# Patient Record
Sex: MALE | Race: WHITE | Employment: OTHER | URBAN - METROPOLITAN AREA
[De-identification: names, ages, dates, MRNs, and addresses within clinical notes are randomized per-mention and may not be internally consistent; named-entity substitution may affect disease eponyms.]

---

## 2017-02-06 ENCOUNTER — OFFICE VISIT (OUTPATIENT)
Dept: FAMILY MEDICINE CLINIC | Age: 67
End: 2017-02-06

## 2017-02-06 VITALS
SYSTOLIC BLOOD PRESSURE: 120 MMHG | OXYGEN SATURATION: 98 % | WEIGHT: 240 LBS | DIASTOLIC BLOOD PRESSURE: 66 MMHG | BODY MASS INDEX: 33.6 KG/M2 | RESPIRATION RATE: 15 BRPM | HEIGHT: 71 IN | HEART RATE: 72 BPM

## 2017-02-06 DIAGNOSIS — K21.9 GASTROESOPHAGEAL REFLUX DISEASE, ESOPHAGITIS PRESENCE NOT SPECIFIED: ICD-10-CM

## 2017-02-06 DIAGNOSIS — N40.0 BENIGN PROSTATIC HYPERPLASIA WITHOUT LOWER URINARY TRACT SYMPTOMS, UNSPECIFIED MORPHOLOGY: ICD-10-CM

## 2017-02-06 DIAGNOSIS — Z23 IMMUNIZATION, PNEUMOCOCCUS AND INFLUENZA: ICD-10-CM

## 2017-02-06 DIAGNOSIS — Z00.00 WELL ADULT EXAM: Primary | ICD-10-CM

## 2017-02-06 DIAGNOSIS — E78.00 HYPERCHOLESTEREMIA: ICD-10-CM

## 2017-02-06 DIAGNOSIS — G89.29 CHRONIC RIGHT-SIDED LOW BACK PAIN WITHOUT SCIATICA: ICD-10-CM

## 2017-02-06 DIAGNOSIS — M54.50 CHRONIC RIGHT-SIDED LOW BACK PAIN WITHOUT SCIATICA: ICD-10-CM

## 2017-02-06 DIAGNOSIS — M18.11 DEGENERATIVE ARTHRITIS OF THUMB, RIGHT: ICD-10-CM

## 2017-02-06 DIAGNOSIS — E55.9 VITAMIN D DEFICIENCY: ICD-10-CM

## 2017-02-06 LAB
A/G RATIO: 1.7 (ref 1.1–2.2)
ALBUMIN SERPL-MCNC: 4.5 G/DL (ref 3.4–5)
ALP BLD-CCNC: 73 U/L (ref 40–129)
ALT SERPL-CCNC: 35 U/L (ref 10–40)
ANION GAP SERPL CALCULATED.3IONS-SCNC: 13 MMOL/L (ref 3–16)
AST SERPL-CCNC: 28 U/L (ref 15–37)
BILIRUB SERPL-MCNC: 0.6 MG/DL (ref 0–1)
BUN BLDV-MCNC: 17 MG/DL (ref 7–20)
CALCIUM SERPL-MCNC: 9.4 MG/DL (ref 8.3–10.6)
CHLORIDE BLD-SCNC: 100 MMOL/L (ref 99–110)
CHOLESTEROL, TOTAL: 144 MG/DL (ref 0–199)
CO2: 26 MMOL/L (ref 21–32)
CREAT SERPL-MCNC: 1 MG/DL (ref 0.8–1.3)
GFR AFRICAN AMERICAN: >60
GFR NON-AFRICAN AMERICAN: >60
GLOBULIN: 2.7 G/DL
GLUCOSE BLD-MCNC: 103 MG/DL (ref 70–99)
HDLC SERPL-MCNC: 53 MG/DL (ref 40–60)
LDL CHOLESTEROL CALCULATED: 77 MG/DL
POTASSIUM SERPL-SCNC: 5.3 MMOL/L (ref 3.5–5.1)
PROSTATE SPECIFIC ANTIGEN: 6.21 NG/ML (ref 0–4)
SODIUM BLD-SCNC: 139 MMOL/L (ref 136–145)
TOTAL PROTEIN: 7.2 G/DL (ref 6.4–8.2)
TRIGL SERPL-MCNC: 72 MG/DL (ref 0–150)
VITAMIN D 25-HYDROXY: 54.4 NG/ML
VLDLC SERPL CALC-MCNC: 14 MG/DL

## 2017-02-06 PROCEDURE — 36415 COLL VENOUS BLD VENIPUNCTURE: CPT | Performed by: FAMILY MEDICINE

## 2017-02-06 PROCEDURE — 99397 PER PM REEVAL EST PAT 65+ YR: CPT | Performed by: FAMILY MEDICINE

## 2017-02-06 PROCEDURE — G0009 ADMIN PNEUMOCOCCAL VACCINE: HCPCS | Performed by: FAMILY MEDICINE

## 2017-02-06 PROCEDURE — 90670 PCV13 VACCINE IM: CPT | Performed by: FAMILY MEDICINE

## 2017-03-16 ENCOUNTER — OFFICE VISIT (OUTPATIENT)
Dept: DERMATOLOGY | Age: 67
End: 2017-03-16

## 2017-03-16 DIAGNOSIS — L57.0 AK (ACTINIC KERATOSIS): ICD-10-CM

## 2017-03-16 DIAGNOSIS — Z85.828 HISTORY OF SKIN CANCER: Primary | ICD-10-CM

## 2017-03-16 DIAGNOSIS — D22.9 MULTIPLE NEVI: ICD-10-CM

## 2017-03-16 PROCEDURE — 1123F ACP DISCUSS/DSCN MKR DOCD: CPT | Performed by: DERMATOLOGY

## 2017-03-16 PROCEDURE — G8427 DOCREV CUR MEDS BY ELIG CLIN: HCPCS | Performed by: DERMATOLOGY

## 2017-03-16 PROCEDURE — G8417 CALC BMI ABV UP PARAM F/U: HCPCS | Performed by: DERMATOLOGY

## 2017-03-16 PROCEDURE — 99213 OFFICE O/P EST LOW 20 MIN: CPT | Performed by: DERMATOLOGY

## 2017-03-16 PROCEDURE — G8484 FLU IMMUNIZE NO ADMIN: HCPCS | Performed by: DERMATOLOGY

## 2017-03-16 PROCEDURE — 4040F PNEUMOC VAC/ADMIN/RCVD: CPT | Performed by: DERMATOLOGY

## 2017-03-16 PROCEDURE — 3017F COLORECTAL CA SCREEN DOC REV: CPT | Performed by: DERMATOLOGY

## 2017-03-16 PROCEDURE — 1036F TOBACCO NON-USER: CPT | Performed by: DERMATOLOGY

## 2017-03-16 PROCEDURE — 17003 DESTRUCT PREMALG LES 2-14: CPT | Performed by: DERMATOLOGY

## 2017-03-16 PROCEDURE — 17000 DESTRUCT PREMALG LESION: CPT | Performed by: DERMATOLOGY

## 2017-04-04 RX ORDER — ATORVASTATIN CALCIUM 40 MG/1
TABLET, FILM COATED ORAL
Qty: 90 TABLET | Refills: 1 | Status: SHIPPED | OUTPATIENT
Start: 2017-04-04 | End: 2017-10-12 | Stop reason: SDUPTHER

## 2017-09-06 ENCOUNTER — OFFICE VISIT (OUTPATIENT)
Dept: FAMILY MEDICINE CLINIC | Age: 67
End: 2017-09-06

## 2017-09-06 VITALS
BODY MASS INDEX: 33.46 KG/M2 | OXYGEN SATURATION: 98 % | HEART RATE: 72 BPM | SYSTOLIC BLOOD PRESSURE: 132 MMHG | DIASTOLIC BLOOD PRESSURE: 76 MMHG | WEIGHT: 239 LBS | RESPIRATION RATE: 13 BRPM | HEIGHT: 71 IN

## 2017-09-06 DIAGNOSIS — R22.2 SUPRACLAVICULAR FOSSA FULLNESS: Primary | ICD-10-CM

## 2017-09-06 PROCEDURE — G8427 DOCREV CUR MEDS BY ELIG CLIN: HCPCS | Performed by: FAMILY MEDICINE

## 2017-09-06 PROCEDURE — 1123F ACP DISCUSS/DSCN MKR DOCD: CPT | Performed by: FAMILY MEDICINE

## 2017-09-06 PROCEDURE — 4040F PNEUMOC VAC/ADMIN/RCVD: CPT | Performed by: FAMILY MEDICINE

## 2017-09-06 PROCEDURE — G8417 CALC BMI ABV UP PARAM F/U: HCPCS | Performed by: FAMILY MEDICINE

## 2017-09-06 PROCEDURE — 1036F TOBACCO NON-USER: CPT | Performed by: FAMILY MEDICINE

## 2017-09-06 PROCEDURE — 3017F COLORECTAL CA SCREEN DOC REV: CPT | Performed by: FAMILY MEDICINE

## 2017-09-06 PROCEDURE — 99213 OFFICE O/P EST LOW 20 MIN: CPT | Performed by: FAMILY MEDICINE

## 2017-09-06 ASSESSMENT — PATIENT HEALTH QUESTIONNAIRE - PHQ9
SUM OF ALL RESPONSES TO PHQ QUESTIONS 1-9: 0
SUM OF ALL RESPONSES TO PHQ9 QUESTIONS 1 & 2: 0
2. FEELING DOWN, DEPRESSED OR HOPELESS: 0
1. LITTLE INTEREST OR PLEASURE IN DOING THINGS: 0

## 2017-09-11 ENCOUNTER — HOSPITAL ENCOUNTER (OUTPATIENT)
Dept: ULTRASOUND IMAGING | Age: 67
Discharge: HOME | End: 2017-09-11
Attending: FAMILY MEDICINE | Admitting: FAMILY MEDICINE

## 2017-09-11 DIAGNOSIS — R22.2 LOCALIZED SWELLING, MASS AND LUMP, TRUNK: ICD-10-CM

## 2017-09-11 DIAGNOSIS — R22.2 SUPRACLAVICULAR FOSSA FULLNESS: ICD-10-CM

## 2017-09-14 ENCOUNTER — OFFICE VISIT (OUTPATIENT)
Dept: SURGERY | Age: 67
End: 2017-09-14

## 2017-09-14 VITALS — SYSTOLIC BLOOD PRESSURE: 140 MMHG | BODY MASS INDEX: 33.24 KG/M2 | WEIGHT: 235 LBS | DIASTOLIC BLOOD PRESSURE: 80 MMHG

## 2017-09-14 DIAGNOSIS — R51.9 ACUTE NONINTRACTABLE HEADACHE, UNSPECIFIED HEADACHE TYPE: Primary | ICD-10-CM

## 2017-09-14 DIAGNOSIS — M54.2 NECK PAIN ON LEFT SIDE: ICD-10-CM

## 2017-09-14 PROCEDURE — 4040F PNEUMOC VAC/ADMIN/RCVD: CPT | Performed by: SURGERY

## 2017-09-14 PROCEDURE — G8427 DOCREV CUR MEDS BY ELIG CLIN: HCPCS | Performed by: SURGERY

## 2017-09-14 PROCEDURE — G8417 CALC BMI ABV UP PARAM F/U: HCPCS | Performed by: SURGERY

## 2017-09-14 PROCEDURE — 1036F TOBACCO NON-USER: CPT | Performed by: SURGERY

## 2017-09-14 PROCEDURE — 3017F COLORECTAL CA SCREEN DOC REV: CPT | Performed by: SURGERY

## 2017-09-14 PROCEDURE — 99202 OFFICE O/P NEW SF 15 MIN: CPT | Performed by: SURGERY

## 2017-09-14 ASSESSMENT — ENCOUNTER SYMPTOMS
ALLERGIC/IMMUNOLOGIC NEGATIVE: 1
EYES NEGATIVE: 1
TROUBLE SWALLOWING: 1
GASTROINTESTINAL NEGATIVE: 1
RESPIRATORY NEGATIVE: 1
SINUS PRESSURE: 1

## 2017-09-19 DIAGNOSIS — R22.1 NECK MASS: Primary | ICD-10-CM

## 2017-10-05 ENCOUNTER — HOSPITAL ENCOUNTER (OUTPATIENT)
Dept: CT IMAGING | Age: 67
Discharge: HOME | End: 2017-10-05
Attending: FAMILY MEDICINE | Admitting: SURGERY

## 2017-10-05 DIAGNOSIS — M54.2 NECK PAIN ON LEFT SIDE: ICD-10-CM

## 2017-10-05 DIAGNOSIS — R51.9 ACUTE NONINTRACTABLE HEADACHE, UNSPECIFIED HEADACHE TYPE: ICD-10-CM

## 2017-10-05 DIAGNOSIS — R51 HEADACHE(784.0): ICD-10-CM

## 2017-10-05 DIAGNOSIS — R51.9 HEADACHE: ICD-10-CM

## 2017-10-05 LAB
BUN BLDV-MCNC: 16 MG/DL (ref 7–20)
CREAT SERPL-MCNC: 0.9 MG/DL (ref 0.8–1.3)
GFR AFRICAN AMERICAN: >60
GFR NON-AFRICAN AMERICAN: >60

## 2017-10-05 NOTE — PROGRESS NOTES
Call pt with result  Has only fatty tissue in left lower neck region of concern, no surgery needed for this.  Still will recommend he see a Neurologist for clinical symptoms

## 2017-10-12 RX ORDER — ATORVASTATIN CALCIUM 40 MG/1
TABLET, FILM COATED ORAL
Qty: 90 TABLET | Refills: 1 | Status: SHIPPED | OUTPATIENT
Start: 2017-10-12 | End: 2018-04-17 | Stop reason: SDUPTHER

## 2017-10-18 ENCOUNTER — OFFICE VISIT (OUTPATIENT)
Dept: NEUROLOGY | Age: 67
End: 2017-10-18

## 2017-10-18 VITALS
HEIGHT: 71 IN | WEIGHT: 235 LBS | SYSTOLIC BLOOD PRESSURE: 157 MMHG | HEART RATE: 64 BPM | BODY MASS INDEX: 32.9 KG/M2 | DIASTOLIC BLOOD PRESSURE: 82 MMHG

## 2017-10-18 DIAGNOSIS — R20.2 NUMBNESS AND TINGLING: ICD-10-CM

## 2017-10-18 DIAGNOSIS — M79.2 NEURITIS: Primary | ICD-10-CM

## 2017-10-18 DIAGNOSIS — M54.2 NECK PAIN: ICD-10-CM

## 2017-10-18 DIAGNOSIS — R20.0 NUMBNESS AND TINGLING: ICD-10-CM

## 2017-10-18 PROCEDURE — 99204 OFFICE O/P NEW MOD 45 MIN: CPT | Performed by: PSYCHIATRY & NEUROLOGY

## 2017-10-18 PROCEDURE — 1123F ACP DISCUSS/DSCN MKR DOCD: CPT | Performed by: PSYCHIATRY & NEUROLOGY

## 2017-10-18 PROCEDURE — 4040F PNEUMOC VAC/ADMIN/RCVD: CPT | Performed by: PSYCHIATRY & NEUROLOGY

## 2017-10-18 PROCEDURE — G8427 DOCREV CUR MEDS BY ELIG CLIN: HCPCS | Performed by: PSYCHIATRY & NEUROLOGY

## 2017-10-18 PROCEDURE — 3017F COLORECTAL CA SCREEN DOC REV: CPT | Performed by: PSYCHIATRY & NEUROLOGY

## 2017-10-18 PROCEDURE — G8484 FLU IMMUNIZE NO ADMIN: HCPCS | Performed by: PSYCHIATRY & NEUROLOGY

## 2017-10-18 PROCEDURE — 1036F TOBACCO NON-USER: CPT | Performed by: PSYCHIATRY & NEUROLOGY

## 2017-10-18 PROCEDURE — G8417 CALC BMI ABV UP PARAM F/U: HCPCS | Performed by: PSYCHIATRY & NEUROLOGY

## 2017-10-18 NOTE — PROGRESS NOTES
[x] Denies all of the above     Cardiovascular:   []  Chest pain    []  Exertional chest pressure/discomfort           [] Palpitations    []  Syncope     [x] Denies all of the above    Gastrointestinal:   []  Abdominal pain   []  Constipation    []  Diarrhea    []   Dysphagia                      [x] Denies all of the above    Genitourinary:      []  Frequency   []  Hematuria     []  Urinary incontinence           [x] Denies all of the above     Hematologic/lymphatic:  []  Bleeding    []  Easy bruising   []  Anemia  [x] Denies all of the above     Musculoskeletal:   [] Back pain       []  Myalgias    [x]  Neck pain           [] Denies all of the above    Neurological: As noted in HPI    Behavioral/Psych:   [] Anxiety    []  Depression     []  Mood swings     [x] Denies all of the above     Endocrine:   []  Temperature intolerance     [x] Fatigue      [] Denies all of the above     Allergic/Immunologic:   [] Hay fever    [x] Denies all of the above     Past Medical History:   Diagnosis Date    Actinic keratosis     BPH (benign prostatic hypertrophy)     BPH w/o urinary obs/LUTS     Bulging disc 1990s    lumbar    Chest pain     Contusion of back     Costochondritis 9/7/2011    Degeneration of intervertebral disc, site unspecified     Diarrhea 9/7/2011    Dysphagia, unspecified     Eosinophilic esophagitis [id number]    DR Maite Marc, EGD    HCV (hepatitis C virus) 12/1997    Hyperpotassemia     Lumbago     Lumbar strain     acute     Other malignant neoplasm of skin, site unspecified     Pain in joint, shoulder region     Partial Achilles tendon tear     LT     Pharyngitis 9/7/2011    Schatzki's ring [id number]    DR Maite Marc, EGD    Screening colonoscopy     colonoscopy (benign polyps) every 5 years 3/2007    Stricture and stenosis of esophagus      Family History   Problem Relation Age of Onset    Depression Mother     Other Mother      CLAVICLE FX    Cancer Father      rectal    Alcohol Abuse Father     Other Father      vasc. heart valve age 61    High Blood Pressure Brother     Other Brother 48     ALS    Heart Disease Maternal Grandmother     Heart Disease Maternal Grandfather     Cancer Maternal Grandfather      colon    Tuberculosis Paternal Grandfather     Cancer Paternal Grandfather      Social History     Social History    Marital status:      Spouse name: N/A    Number of children: N/A    Years of education: N/A     Occupational History    Retired      Social History Main Topics    Smoking status: Former Smoker     Years: 19.00     Types: Cigarettes    Smokeless tobacco: Never Used      Comment: Educated to avoid tobacco.    Alcohol use 0.6 oz/week     1 Cans of beer per week      Comment: 3 to 4 times per week    Drug use: No    Sexual activity: Not Asked     Other Topics Concern    None     Social History Narrative    None       PHYSICAL EXAMINATION:  BP (!) 157/82   Pulse 64   Ht 5' 11\" (1.803 m)   Wt 235 lb (106.6 kg)   BMI 32.78 kg/m²   Appearance: Well appearing, well nourished and in no distress  Mental Status Exam: Patient is alert, oriented to person, place and time. Recent and remote memory is normal  Fund of Knowledge is normal  Attention/concentration is normal.   Speech : No dysarthria  Language : No aphasia  Funduscopic Exam: sharp disc margins  Cranial Nerves:   II: Visual fields:  Full to confrontation  III: Pupils:  equal, round, reactive to light  III,IV,VI: Extra Ocular Movements are intact. No nystagmus  V: Facial sensation is intact to pin prick and light touch  VII: Facial strength and movements: intact and symmetric smile,cheek puffing and eyebrow elevation  VIII: Hearing:  Intact to finger rub bilaterally  IX: Palate  elevation is symmetric  XI: Shoulder shrug is intact  XII: Tongue movements are normal  Motor:  Muscle tone and bulk are normal.   Strength is symmetrical 5/5 in all four extremities.   Sensory: Intact to light touch and  pin prick in all four extremities  Coordination:  Normal  Finger to Nose and Heel to Shin bilaterally    . Reflexes:  DTR +2 and symmetric bilaterally  Plantar response: Flexor bilaterally  Gait: Gait and station is normal. Patient can toe/ heel and tandem walk without difficulty  Romberg: negative  Vascular: No carotid bruit bilaterally        DATA:  LABS:  General Labs:    BMP:    Lab Results   Component Value Date     02/06/2017    K 5.3 02/06/2017     02/06/2017    CO2 26 02/06/2017    BUN 16 10/05/2017    LABALBU 4.5 02/06/2017    CREATININE 0.9 10/05/2017    CALCIUM 9.4 02/06/2017    GFRAA >60 10/05/2017    GFRAA >60 09/17/2012    LABGLOM >60 10/05/2017    LABGLOM 81.0 04/26/2011    GLUCOSE 103 02/06/2017    GLUCOSE 109 04/26/2011     RADIOLOGY REVIEW:  I have reviewed radiology report(s) of:  CT scan of the soft tissues of the neck    IMPRESSION :  Patient has neuritis in the left side of the neck causing some numbness and itching in the left earlobe and some numbness in the left side of the neck. The symptoms are worse when he turns his head and neck towards the left side. He has some fatty deposition in the left supraclavicular fossa and this might restrict the neck movement there. Cervical radiculopathy was considered in the differential diagnosis but felt to be less likely  CT scan of the soft tissues of the neck showed a fatty deposition in the left supraclavicular fossa      RECOMMENDATIONS :  Discussed treatment options with patient  I would like to start with physical therapy  I will see him back in a couple of months  If there is no improvement then we can try low-dose gabapentin  Thank you for this consultation        Please note a portion of this chart was generated using dragon dictation software. Although every effort was made to ensure the accuracy of this automated transcription, some errors in transcription may have occurred.

## 2017-10-18 NOTE — PATIENT INSTRUCTIONS
Please call with any questions or concerns:   [facility]  @ [phone]. LAB RESULTS:  Please obtain any labs or diagnostic tests as discussed today. You may call the office to check the results. Please allow  3 to 7 days for us to get these results. MEDICATION LIST:  Please bring an accurate list of your medications to every visit. APPOINTMENT CONFIRMATION:  We will call you the day before your scheduled appointment to confirm. If we are unable to reach you, you MUST call back by the end of the day to confirm the appointment or we may be forced to cancel.

## 2017-10-18 NOTE — LETTER
III,IV,VI: Extra Ocular Movements are intact. No nystagmus  V: Facial sensation is intact to pin prick and light touch  VII: Facial strength and movements: intact and symmetric smile,cheek puffing and eyebrow elevation  VIII: Hearing:  Intact to finger rub bilaterally  IX: Palate  elevation is symmetric  XI: Shoulder shrug is intact  XII: Tongue movements are normal  Motor:  Muscle tone and bulk are normal.   Strength is symmetrical 5/5 in all four extremities. Sensory: Intact to light touch and  pin prick in all four extremities  Coordination:  Normal  Finger to Nose and Heel to Shin bilaterally    . Reflexes:  DTR +2 and symmetric bilaterally  Plantar response: Flexor bilaterally  Gait: Gait and station is normal. Patient can toe/ heel and tandem walk without difficulty  Romberg: negative  Vascular: No carotid bruit bilaterally        DATA:  LABS:  General Labs:    BMP:    Lab Results   Component Value Date     02/06/2017    K 5.3 02/06/2017     02/06/2017    CO2 26 02/06/2017    BUN 16 10/05/2017    LABALBU 4.5 02/06/2017    CREATININE 0.9 10/05/2017    CALCIUM 9.4 02/06/2017    GFRAA >60 10/05/2017    GFRAA >60 09/17/2012    LABGLOM >60 10/05/2017    LABGLOM 81.0 04/26/2011    GLUCOSE 103 02/06/2017    GLUCOSE 109 04/26/2011     RADIOLOGY REVIEW:  I have reviewed radiology report(s) of:  CT scan of the soft tissues of the neck    IMPRESSION :  Patient has neuritis in the left side of the neck causing some numbness and itching in the left earlobe and some numbness in the left side of the neck. The symptoms are worse when he turns his head and neck towards the left side. He has some fatty deposition in the left supraclavicular fossa and this might restrict the neck movement there.   Cervical radiculopathy was considered in the differential diagnosis but felt to be less likely  CT scan of the soft tissues of the neck showed a fatty deposition in the left supraclavicular fossa      RECOMMENDATIONS : Discussed treatment options with patient  I would like to start with physical therapy  I will see him back in a couple of months  If there is no improvement then we can try low-dose gabapentin  Thank you for this consultation        Please note a portion of this chart was generated using dragon dictation software. Although every effort was made to ensure the accuracy of this automated transcription, some errors in transcription may have occurred. If you have questions, please do not hesitate to call me. I look forward to following Alivia Enter along with you.     Sincerely,        Helene Jackman MD    CC providers:  MD Shawnee Herr  [address], [address] [place] [address]  VIA [place]

## 2018-02-28 ENCOUNTER — OFFICE VISIT (OUTPATIENT)
Dept: ORTHOPEDIC SURGERY | Age: 68
End: 2018-02-28

## 2018-02-28 VITALS
SYSTOLIC BLOOD PRESSURE: 144 MMHG | HEIGHT: 71 IN | WEIGHT: 235 LBS | DIASTOLIC BLOOD PRESSURE: 83 MMHG | HEART RATE: 66 BPM | BODY MASS INDEX: 32.9 KG/M2

## 2018-02-28 DIAGNOSIS — M18.11 PRIMARY OSTEOARTHRITIS OF FIRST CARPOMETACARPAL JOINT OF RIGHT HAND: Primary | ICD-10-CM

## 2018-02-28 PROCEDURE — 1036F TOBACCO NON-USER: CPT | Performed by: ORTHOPAEDIC SURGERY

## 2018-02-28 PROCEDURE — 3017F COLORECTAL CA SCREEN DOC REV: CPT | Performed by: ORTHOPAEDIC SURGERY

## 2018-02-28 PROCEDURE — 99213 OFFICE O/P EST LOW 20 MIN: CPT | Performed by: ORTHOPAEDIC SURGERY

## 2018-02-28 PROCEDURE — 1123F ACP DISCUSS/DSCN MKR DOCD: CPT | Performed by: ORTHOPAEDIC SURGERY

## 2018-02-28 PROCEDURE — G8484 FLU IMMUNIZE NO ADMIN: HCPCS | Performed by: ORTHOPAEDIC SURGERY

## 2018-02-28 PROCEDURE — 4040F PNEUMOC VAC/ADMIN/RCVD: CPT | Performed by: ORTHOPAEDIC SURGERY

## 2018-02-28 PROCEDURE — G8417 CALC BMI ABV UP PARAM F/U: HCPCS | Performed by: ORTHOPAEDIC SURGERY

## 2018-02-28 PROCEDURE — G8427 DOCREV CUR MEDS BY ELIG CLIN: HCPCS | Performed by: ORTHOPAEDIC SURGERY

## 2018-02-28 PROCEDURE — 20605 DRAIN/INJ JOINT/BURSA W/O US: CPT | Performed by: ORTHOPAEDIC SURGERY

## 2018-02-28 NOTE — Clinical Note
Dear  Kati Crooks MD,  Thank you very much for your referral or Mr. Pritesh Rincon to me for evaluation and treatment of his Hand & Wrist condition. I appreciate your confidence in me and thank you for allowing me the opportunity to care for your patients. If I can be of any further assistance to you on this or any other patient, please do not hesitate to contact me. Sincerely,  Leonardo Washburn.  Anuj Molina MD IADL retraining/balance training/transfer training/bed mobility training/ROM/ADL retraining/strengthening

## 2018-03-01 NOTE — PROGRESS NOTES
Mr. Ja Lund returns today in follow-up of his previously treated  right Thumb CMC Degenerative Osteoarthritis. He was last seen in April, 2016 at which time he was treated with Conservative Measures and Splinting. He experienced moderate relief of his initial symptoms. He  has noticed symptom recurrence & progression  over the last several months. He returns today with Worsened symptoms of right Thumb CMC Degenerative Osteoarthritis, requesting further treatment. The patient's , past medical history, medications, allergies,  family history, social history, and review of systems have been reviewed and are recorded in the chart. Physical Exam:  Vitals  BP: (!) 144/83  Pulse: 66  Height: 5' 11\" (180.3 cm)  Weight: 235 lb (106.6 kg)  Mr. Ja Lund appears well, he is in no apparent distress, he demonstrates appropriate mood & affect. Skin, bilaterally: Skin color, texture, turgor normal. No rashes or lesions. Digital range of motion is full and equal bilateral otherwise normal bilaterally. Thumb range of motion limited and painful in all spheres at the basilar joint on the Right, greater than Left. Wrist range of motion is equal bilateral otherwise normal bilaterally. There is no evidence of gross joint instability bilaterally. Sensation is subjectively normal bilaterally. Vascular examination reveals normal and good capillary refill bilaterally. Swelling is mild on the Right, normal on the Left. Muscular strength is clinically appropriate bilaterally. Prominence is moderate at the radial base of the Thumb on the Right, normal on the Left. Examination of the right first Carpo-Metacarpal Joint of the wrist demonstrates moderate radial subluxation of the Thumb Metacarpal base upon the Trapezium. There is moderate pain with palpation at the [facility] joint line; pain is markedly worsened with Crank & Grind Maneuvers.   There is not additional discomfort at the [facility]

## 2018-03-01 NOTE — PATIENT INSTRUCTIONS
Information & Instructions   After Finger, Hand, Wrist, or Elbow Injection    Jhonny Gomez MD    You have received an injection of local anesthetic (Bupivicaine without Epinephrine) for comfort & a steroid (Kenalog) for its strong anti-inflammatory effects. In order to give the medication a chance to reduce your inflammation and discomfort, it is recommended that you take it easy for a day or so. You may use your hand and arm as you feel comfortable, but you should avoid highly strenuous activity and heavy use for several days. Relief from the injection will often not begin for several days, and you may not feel full relief for up to one month. It is not uncommon to experience some local discomfort or pain at or around the injection site for a few days. To relieve these symptoms you may do the following if you feel necessary:       Apply ice to the affected area 20 minutes on and 20 minutes off. Do not apply ice directly to the skin. Use a thin layer (T-shirt, pillowcase, towel, etc.) to protect the skin. - If allowed by your other medical physicians, you may take -     2 Tylenol extra strength tablets every 4-6 hours       1-2 Aleve tablets twice a day     2-3 Advil tablets two to three times a day    If you are diabetic, the steroid medication may increase your blood sugar, so you are advised to monitor your sugar more closely so you can adjust it accordingly for a few days following your injection. If you need assistance with the control of you blood sugar, please contact you primary care physician for further advice. I will request that you please call the office one month after your injection at [phone] if you have not experienced relief of your symptoms (unless I have instructed you otherwise). If your injection has given you good relief of you symptoms as expected, then you only need to call the office if your symptoms return.

## 2018-03-07 ENCOUNTER — TELEPHONE (OUTPATIENT)
Dept: FAMILY MEDICINE CLINIC | Age: 68
End: 2018-03-07

## 2018-03-07 DIAGNOSIS — N40.0 BENIGN PROSTATIC HYPERPLASIA, UNSPECIFIED WHETHER LOWER URINARY TRACT SYMPTOMS PRESENT: ICD-10-CM

## 2018-03-07 DIAGNOSIS — R97.20 ELEVATED PSA: Primary | ICD-10-CM

## 2018-03-07 DIAGNOSIS — R73.9 HYPERGLYCEMIA: ICD-10-CM

## 2018-03-07 DIAGNOSIS — Z13.220 LIPID SCREENING: ICD-10-CM

## 2018-03-07 DIAGNOSIS — E87.5 HYPERKALEMIA: ICD-10-CM

## 2018-03-22 ENCOUNTER — NURSE ONLY (OUTPATIENT)
Dept: FAMILY MEDICINE CLINIC | Age: 68
End: 2018-03-22

## 2018-03-22 DIAGNOSIS — E87.5 HYPERKALEMIA: ICD-10-CM

## 2018-03-22 DIAGNOSIS — R73.9 HYPERGLYCEMIA: ICD-10-CM

## 2018-03-22 DIAGNOSIS — N40.0 BENIGN PROSTATIC HYPERPLASIA, UNSPECIFIED WHETHER LOWER URINARY TRACT SYMPTOMS PRESENT: ICD-10-CM

## 2018-03-22 DIAGNOSIS — R97.20 ELEVATED PSA: ICD-10-CM

## 2018-03-22 DIAGNOSIS — Z23 NEED FOR PNEUMOCOCCAL VACCINATION: Primary | ICD-10-CM

## 2018-03-22 DIAGNOSIS — Z13.220 LIPID SCREENING: ICD-10-CM

## 2018-03-22 LAB
A/G RATIO: 1.8 (ref 1.1–2.2)
ALBUMIN SERPL-MCNC: 4.6 G/DL (ref 3.4–5)
ALP BLD-CCNC: 59 U/L (ref 40–129)
ALT SERPL-CCNC: 27 U/L (ref 10–40)
ANION GAP SERPL CALCULATED.3IONS-SCNC: 14 MMOL/L (ref 3–16)
AST SERPL-CCNC: 21 U/L (ref 15–37)
BILIRUB SERPL-MCNC: 0.6 MG/DL (ref 0–1)
BUN BLDV-MCNC: 16 MG/DL (ref 7–20)
CALCIUM SERPL-MCNC: 9.1 MG/DL (ref 8.3–10.6)
CHLORIDE BLD-SCNC: 98 MMOL/L (ref 99–110)
CHOLESTEROL, TOTAL: 152 MG/DL (ref 0–199)
CO2: 27 MMOL/L (ref 21–32)
CREAT SERPL-MCNC: 0.9 MG/DL (ref 0.8–1.3)
GFR AFRICAN AMERICAN: >60
GFR NON-AFRICAN AMERICAN: >60
GLOBULIN: 2.5 G/DL
GLUCOSE BLD-MCNC: 102 MG/DL (ref 70–99)
HDLC SERPL-MCNC: 68 MG/DL (ref 40–60)
LDL CHOLESTEROL CALCULATED: 69 MG/DL
POTASSIUM SERPL-SCNC: 5 MMOL/L (ref 3.5–5.1)
PROSTATE SPECIFIC ANTIGEN: 5.6 NG/ML (ref 0–4)
SODIUM BLD-SCNC: 139 MMOL/L (ref 136–145)
TOTAL PROTEIN: 7.1 G/DL (ref 6.4–8.2)
TRIGL SERPL-MCNC: 77 MG/DL (ref 0–150)
VLDLC SERPL CALC-MCNC: 15 MG/DL

## 2018-03-22 PROCEDURE — 90732 PPSV23 VACC 2 YRS+ SUBQ/IM: CPT | Performed by: FAMILY MEDICINE

## 2018-03-22 PROCEDURE — 36415 COLL VENOUS BLD VENIPUNCTURE: CPT | Performed by: FAMILY MEDICINE

## 2018-03-22 PROCEDURE — G0009 ADMIN PNEUMOCOCCAL VACCINE: HCPCS | Performed by: FAMILY MEDICINE

## 2018-03-29 ENCOUNTER — TELEPHONE (OUTPATIENT)
Dept: FAMILY MEDICINE CLINIC | Age: 68
End: 2018-03-29

## 2018-04-02 ENCOUNTER — OFFICE VISIT (OUTPATIENT)
Dept: DERMATOLOGY | Age: 68
End: 2018-04-02

## 2018-04-02 DIAGNOSIS — Z85.828 HISTORY OF NONMELANOMA SKIN CANCER: Primary | ICD-10-CM

## 2018-04-02 DIAGNOSIS — L57.0 AK (ACTINIC KERATOSIS): ICD-10-CM

## 2018-04-02 DIAGNOSIS — D22.9 MULTIPLE NEVI: ICD-10-CM

## 2018-04-02 PROCEDURE — 4040F PNEUMOC VAC/ADMIN/RCVD: CPT | Performed by: DERMATOLOGY

## 2018-04-02 PROCEDURE — G8427 DOCREV CUR MEDS BY ELIG CLIN: HCPCS | Performed by: DERMATOLOGY

## 2018-04-02 PROCEDURE — 3017F COLORECTAL CA SCREEN DOC REV: CPT | Performed by: DERMATOLOGY

## 2018-04-02 PROCEDURE — 1123F ACP DISCUSS/DSCN MKR DOCD: CPT | Performed by: DERMATOLOGY

## 2018-04-02 PROCEDURE — 1036F TOBACCO NON-USER: CPT | Performed by: DERMATOLOGY

## 2018-04-02 PROCEDURE — G8417 CALC BMI ABV UP PARAM F/U: HCPCS | Performed by: DERMATOLOGY

## 2018-04-02 PROCEDURE — 17003 DESTRUCT PREMALG LES 2-14: CPT | Performed by: DERMATOLOGY

## 2018-04-02 PROCEDURE — 99213 OFFICE O/P EST LOW 20 MIN: CPT | Performed by: DERMATOLOGY

## 2018-04-02 PROCEDURE — 17000 DESTRUCT PREMALG LESION: CPT | Performed by: DERMATOLOGY

## 2018-04-02 RX ORDER — RANITIDINE 150 MG/1
150 TABLET ORAL 2 TIMES DAILY
COMMUNITY
End: 2020-10-20 | Stop reason: ALTCHOICE

## 2018-04-05 ENCOUNTER — OFFICE VISIT (OUTPATIENT)
Dept: FAMILY MEDICINE CLINIC | Age: 68
End: 2018-04-05

## 2018-04-05 VITALS
HEART RATE: 68 BPM | WEIGHT: 232 LBS | HEIGHT: 70 IN | DIASTOLIC BLOOD PRESSURE: 74 MMHG | BODY MASS INDEX: 33.21 KG/M2 | SYSTOLIC BLOOD PRESSURE: 120 MMHG | RESPIRATION RATE: 16 BRPM

## 2018-04-05 DIAGNOSIS — Z00.00 WELL ADULT EXAM: Primary | ICD-10-CM

## 2018-04-05 DIAGNOSIS — N40.1 BENIGN PROSTATIC HYPERPLASIA WITH NOCTURIA: ICD-10-CM

## 2018-04-05 DIAGNOSIS — R97.20 ELEVATED PSA: ICD-10-CM

## 2018-04-05 DIAGNOSIS — R35.1 BENIGN PROSTATIC HYPERPLASIA WITH NOCTURIA: ICD-10-CM

## 2018-04-05 DIAGNOSIS — R07.89 CHEST WALL PAIN: ICD-10-CM

## 2018-04-05 DIAGNOSIS — E78.00 HYPERCHOLESTEREMIA: ICD-10-CM

## 2018-04-05 DIAGNOSIS — R73.9 HYPERGLYCEMIA: ICD-10-CM

## 2018-04-05 PROCEDURE — 4040F PNEUMOC VAC/ADMIN/RCVD: CPT | Performed by: FAMILY MEDICINE

## 2018-04-05 PROCEDURE — G8427 DOCREV CUR MEDS BY ELIG CLIN: HCPCS | Performed by: FAMILY MEDICINE

## 2018-04-05 PROCEDURE — G8417 CALC BMI ABV UP PARAM F/U: HCPCS | Performed by: FAMILY MEDICINE

## 2018-04-05 PROCEDURE — 3017F COLORECTAL CA SCREEN DOC REV: CPT | Performed by: FAMILY MEDICINE

## 2018-04-05 PROCEDURE — 99397 PER PM REEVAL EST PAT 65+ YR: CPT | Performed by: FAMILY MEDICINE

## 2018-04-05 PROCEDURE — 1123F ACP DISCUSS/DSCN MKR DOCD: CPT | Performed by: FAMILY MEDICINE

## 2018-04-05 PROCEDURE — 1036F TOBACCO NON-USER: CPT | Performed by: FAMILY MEDICINE

## 2018-04-17 RX ORDER — ATORVASTATIN CALCIUM 40 MG/1
TABLET, FILM COATED ORAL
Qty: 90 TABLET | Refills: 1 | Status: SHIPPED | OUTPATIENT
Start: 2018-04-17 | End: 2018-07-17 | Stop reason: SDUPTHER

## 2018-07-16 ENCOUNTER — TELEPHONE (OUTPATIENT)
Dept: FAMILY MEDICINE CLINIC | Age: 68
End: 2018-07-16

## 2018-07-16 NOTE — TELEPHONE ENCOUNTER
Pt did get his results of his calcium cardiac scoring. He just wants to know what all the numbers mean.   Please call

## 2018-07-17 DIAGNOSIS — R07.89 CHEST PRESSURE: Primary | ICD-10-CM

## 2018-07-17 RX ORDER — ATORVASTATIN CALCIUM 40 MG/1
TABLET, FILM COATED ORAL
Qty: 90 TABLET | Refills: 2 | Status: SHIPPED | OUTPATIENT
Start: 2018-07-17 | End: 2019-08-01 | Stop reason: SDUPTHER

## 2018-07-20 DIAGNOSIS — R93.1 ELEVATED CORONARY ARTERY CALCIUM SCORE: ICD-10-CM

## 2018-07-20 DIAGNOSIS — R07.9 CHEST PAIN, UNSPECIFIED TYPE: Primary | ICD-10-CM

## 2018-07-23 ENCOUNTER — HOSPITAL ENCOUNTER (OUTPATIENT)
Dept: NON INVASIVE DIAGNOSTICS | Age: 68
Discharge: HOME | End: 2018-07-23
Attending: INTERNAL MEDICINE | Admitting: INTERNAL MEDICINE

## 2018-07-23 DIAGNOSIS — R07.9 CHEST PAIN: ICD-10-CM

## 2018-07-23 LAB
LV EF: 73 %
LVEF MODALITY: NORMAL

## 2018-08-14 ENCOUNTER — OFFICE VISIT (OUTPATIENT)
Dept: CARDIOLOGY CLINIC | Age: 68
End: 2018-08-14

## 2018-08-14 VITALS
BODY MASS INDEX: 33.64 KG/M2 | SYSTOLIC BLOOD PRESSURE: 130 MMHG | RESPIRATION RATE: 14 BRPM | OXYGEN SATURATION: 97 % | WEIGHT: 235 LBS | HEART RATE: 62 BPM | DIASTOLIC BLOOD PRESSURE: 72 MMHG | HEIGHT: 70 IN

## 2018-08-14 DIAGNOSIS — R07.9 CHEST PAIN, UNSPECIFIED TYPE: Primary | ICD-10-CM

## 2018-08-14 DIAGNOSIS — E78.5 HYPERLIPIDEMIA, UNSPECIFIED HYPERLIPIDEMIA TYPE: ICD-10-CM

## 2018-08-14 DIAGNOSIS — R06.09 DYSPNEA ON EXERTION: ICD-10-CM

## 2018-08-14 PROCEDURE — 1036F TOBACCO NON-USER: CPT | Performed by: INTERNAL MEDICINE

## 2018-08-14 PROCEDURE — G8417 CALC BMI ABV UP PARAM F/U: HCPCS | Performed by: INTERNAL MEDICINE

## 2018-08-14 PROCEDURE — 99204 OFFICE O/P NEW MOD 45 MIN: CPT | Performed by: INTERNAL MEDICINE

## 2018-08-14 PROCEDURE — 1101F PT FALLS ASSESS-DOCD LE1/YR: CPT | Performed by: INTERNAL MEDICINE

## 2018-08-14 PROCEDURE — 1123F ACP DISCUSS/DSCN MKR DOCD: CPT | Performed by: INTERNAL MEDICINE

## 2018-08-14 PROCEDURE — G8427 DOCREV CUR MEDS BY ELIG CLIN: HCPCS | Performed by: INTERNAL MEDICINE

## 2018-08-14 PROCEDURE — 3017F COLORECTAL CA SCREEN DOC REV: CPT | Performed by: INTERNAL MEDICINE

## 2018-08-14 PROCEDURE — 4040F PNEUMOC VAC/ADMIN/RCVD: CPT | Performed by: INTERNAL MEDICINE

## 2018-09-13 ENCOUNTER — TELEPHONE (OUTPATIENT)
Dept: FAMILY MEDICINE CLINIC | Age: 68
End: 2018-09-13

## 2019-01-10 ENCOUNTER — TELEPHONE (OUTPATIENT)
Dept: FAMILY MEDICINE CLINIC | Age: 69
End: 2019-01-10

## 2019-01-10 DIAGNOSIS — E78.00 HYPERCHOLESTEREMIA: Primary | ICD-10-CM

## 2019-01-10 DIAGNOSIS — R97.20 ELEVATED PSA: ICD-10-CM

## 2019-01-17 ENCOUNTER — TELEPHONE (OUTPATIENT)
Dept: DERMATOLOGY | Age: 69
End: 2019-01-17

## 2019-04-02 ENCOUNTER — NURSE ONLY (OUTPATIENT)
Dept: FAMILY MEDICINE CLINIC | Age: 69
End: 2019-04-02
Payer: MEDICARE

## 2019-04-02 DIAGNOSIS — E78.00 HYPERCHOLESTEREMIA: ICD-10-CM

## 2019-04-02 DIAGNOSIS — R97.20 ELEVATED PSA: ICD-10-CM

## 2019-04-02 LAB
A/G RATIO: 1.4 (ref 1.1–2.2)
ALBUMIN SERPL-MCNC: 4.2 G/DL (ref 3.4–5)
ALP BLD-CCNC: 64 U/L (ref 40–129)
ALT SERPL-CCNC: 21 U/L (ref 10–40)
ANION GAP SERPL CALCULATED.3IONS-SCNC: 10 MMOL/L (ref 3–16)
AST SERPL-CCNC: 19 U/L (ref 15–37)
BILIRUB SERPL-MCNC: 0.6 MG/DL (ref 0–1)
BUN BLDV-MCNC: 14 MG/DL (ref 7–20)
CALCIUM SERPL-MCNC: 9.1 MG/DL (ref 8.3–10.6)
CHLORIDE BLD-SCNC: 101 MMOL/L (ref 99–110)
CHOLESTEROL, TOTAL: 139 MG/DL (ref 0–199)
CO2: 27 MMOL/L (ref 21–32)
CREAT SERPL-MCNC: 1 MG/DL (ref 0.8–1.3)
GFR AFRICAN AMERICAN: >60
GFR NON-AFRICAN AMERICAN: >60
GLOBULIN: 2.9 G/DL
GLUCOSE BLD-MCNC: 114 MG/DL (ref 70–99)
HDLC SERPL-MCNC: 51 MG/DL (ref 40–60)
LDL CHOLESTEROL CALCULATED: 65 MG/DL
POTASSIUM SERPL-SCNC: 4.4 MMOL/L (ref 3.5–5.1)
PROSTATE SPECIFIC ANTIGEN: 4.17 NG/ML (ref 0–4)
SODIUM BLD-SCNC: 138 MMOL/L (ref 136–145)
TOTAL PROTEIN: 7.1 G/DL (ref 6.4–8.2)
TRIGL SERPL-MCNC: 116 MG/DL (ref 0–150)
VLDLC SERPL CALC-MCNC: 23 MG/DL

## 2019-04-02 PROCEDURE — 36415 COLL VENOUS BLD VENIPUNCTURE: CPT | Performed by: FAMILY MEDICINE

## 2019-04-09 ENCOUNTER — OFFICE VISIT (OUTPATIENT)
Dept: FAMILY MEDICINE CLINIC | Age: 69
End: 2019-04-09
Payer: MEDICARE

## 2019-04-09 VITALS
DIASTOLIC BLOOD PRESSURE: 72 MMHG | RESPIRATION RATE: 16 BRPM | BODY MASS INDEX: 34.22 KG/M2 | HEART RATE: 76 BPM | WEIGHT: 239 LBS | SYSTOLIC BLOOD PRESSURE: 124 MMHG | HEIGHT: 70 IN

## 2019-04-09 DIAGNOSIS — R73.9 HYPERGLYCEMIA: ICD-10-CM

## 2019-04-09 DIAGNOSIS — E78.00 HYPERCHOLESTEREMIA: ICD-10-CM

## 2019-04-09 DIAGNOSIS — N40.0 BENIGN PROSTATIC HYPERPLASIA, UNSPECIFIED WHETHER LOWER URINARY TRACT SYMPTOMS PRESENT: ICD-10-CM

## 2019-04-09 DIAGNOSIS — R06.02 SOBOE (SHORTNESS OF BREATH ON EXERTION): ICD-10-CM

## 2019-04-09 DIAGNOSIS — M75.80 ROTATOR CUFF TENDINITIS, UNSPECIFIED LATERALITY: ICD-10-CM

## 2019-04-09 DIAGNOSIS — M54.2 NECK PAIN: ICD-10-CM

## 2019-04-09 DIAGNOSIS — Z00.00 WELL ADULT EXAM: Primary | ICD-10-CM

## 2019-04-09 PROCEDURE — G0438 PPPS, INITIAL VISIT: HCPCS | Performed by: FAMILY MEDICINE

## 2019-04-09 ASSESSMENT — PATIENT HEALTH QUESTIONNAIRE - PHQ9
2. FEELING DOWN, DEPRESSED OR HOPELESS: 0
SUM OF ALL RESPONSES TO PHQ QUESTIONS 1-9: 0
SUM OF ALL RESPONSES TO PHQ QUESTIONS 1-9: 0
SUM OF ALL RESPONSES TO PHQ9 QUESTIONS 1 & 2: 0
1. LITTLE INTEREST OR PLEASURE IN DOING THINGS: 0

## 2019-04-09 NOTE — PROGRESS NOTES
2019     Viktoria Urrutia (:  1950) is a 77 y.o. male, here for evaluation of the following medical concerns:    HPI  No concerns other than weight gain  Sloppy/ lazy winter per pt  Concern w/ diabetes  Urology appt this am - had turp procedure 3 years ago  Prostate exam good  Past Surgical History:   Procedure Laterality Date    APPENDECTOMY  2000        COLONOSCOPY      DR Devne Cruz, COLONOSCOPY, NO POLYPS, REPEAT 5 YRS    EXTERNAL EAR SURGERY      left - SCC    KNEE ARTHROSCOPY  2000    left    TURP  16    UPPER GASTROINTESTINAL ENDOSCOPY  [id number]    DR Deven Cruz, SCHATZKI'S RING AND EOSINOHILIC ESOPHAGITIS     Family History   Problem Relation Age of Onset    Depression Mother     Other Mother         CLAVICLE FX    Cancer Father         rectal    Alcohol Abuse Father     Other Father         vasc. heart valve age 61   Luis  High Blood Pressure Brother     Other Brother 48        ALS    Heart Disease Maternal Grandmother     Heart Disease Maternal Grandfather     Cancer Maternal Grandfather         colon    Tuberculosis Paternal Grandfather     Cancer Paternal Grandfather          Review of Systems  Good urine flow  Some neck pain - at times into head - no headaches/ dizzyness  Bruised feeling left chest - hasn't noticed for awhile  A little more winded w/ exertion - some congestion in lungs  Mild allergy/ sinus sx  Stress test negative  -   Fatty lump left supraclavicular unchanged. No swelling - occ tingling in feet - some color change   Some pain big toe knuckles - seems like rom reduced. gerd off and on - on zantac - hx of esophageal dilation  Good for 4 more of 10 years on colonoscopy  Good bm's. Sees derm later this month for skin check  Sees eye doctor annually  Told may have early cataract/ mild elevated iop  Vision good  Hearing good. Prior to Visit Medications    Medication Sig Taking?  Authorizing Provider   atorvastatin (LIPITOR) 40 MG tablet TAKE 1 TABLET BY MOUTH EVERY DAY Yes Shaunna Skiff, MD   ranitidine (ZANTAC) 150 MG tablet Take 150 mg by mouth 2 times daily Yes Historical Provider, MD   Cholecalciferol (VITAMIN D3) 5000 UNITS CAPS Take 1 capsule by mouth daily Indications: 2000 units daily per PT  Yes Historical Provider, MD   RESTASIS 0.05 % ophthalmic emulsion Place 1 drop into both eyes 2 times daily. Yes Historical Provider, MD   aspirin 81 MG EC tablet Take 81 mg by mouth daily. Yes Historical Provider, MD   multivitamin [facility]) per tablet Take 1 tablet by mouth daily. Yes Historical Provider, MD        Social History     Tobacco Use    Smoking status: Former Smoker     Packs/day: 1.00     Years: 19.00     Pack years: 19.00     Types: Cigarettes     Last attempt to quit: 1990     Years since quittin.2    Smokeless tobacco: Never Used    Tobacco comment: Educated to avoid tobacco.   Substance Use Topics    Alcohol use: Yes     Alcohol/week: 0.6 oz     Types: 1 Cans of beer per week     Comment: 3 to 4 times per week        Vitals:    19 1329   BP: 124/72   Site: Left Upper Arm   Position: Sitting   Cuff Size: Medium Adult   Pulse: 76   Resp: 16   Weight: 239 lb (108.4 kg)   Height: 5' 10\" (1.778 m)     Estimated body mass index is 34.29 kg/m² as calculated from the following:    Height as of this encounter: 5' 10\" (1.778 m). Weight as of this encounter: 239 lb (108.4 kg). Physical Exam   Constitutional: He appears well-developed. No distress. HENT:   Mouth/Throat: Oropharynx is clear and moist.   Eyes: Conjunctivae are normal. No scleral icterus. Cardiovascular: Normal rate, regular rhythm, normal heart sounds and intact distal pulses. Exam reveals no gallop. No murmur heard. Pulmonary/Chest: Effort normal and breath sounds normal. No respiratory distress. He has no wheezes. He has no rhonchi. He has no rales. Abdominal: Soft. Bowel sounds are normal. He exhibits no distension.  There is no

## 2019-04-29 ENCOUNTER — OFFICE VISIT (OUTPATIENT)
Dept: DERMATOLOGY | Age: 69
End: 2019-04-29
Payer: MEDICARE

## 2019-04-29 DIAGNOSIS — L57.0 AK (ACTINIC KERATOSIS): ICD-10-CM

## 2019-04-29 DIAGNOSIS — D22.9 MULTIPLE NEVI: ICD-10-CM

## 2019-04-29 DIAGNOSIS — Z85.828 HISTORY OF NONMELANOMA SKIN CANCER: Primary | ICD-10-CM

## 2019-04-29 PROCEDURE — 1036F TOBACCO NON-USER: CPT | Performed by: DERMATOLOGY

## 2019-04-29 PROCEDURE — 1123F ACP DISCUSS/DSCN MKR DOCD: CPT | Performed by: DERMATOLOGY

## 2019-04-29 PROCEDURE — G8427 DOCREV CUR MEDS BY ELIG CLIN: HCPCS | Performed by: DERMATOLOGY

## 2019-04-29 PROCEDURE — 99213 OFFICE O/P EST LOW 20 MIN: CPT | Performed by: DERMATOLOGY

## 2019-04-29 PROCEDURE — 17004 DESTROY PREMAL LESIONS 15/>: CPT | Performed by: DERMATOLOGY

## 2019-04-29 PROCEDURE — G8417 CALC BMI ABV UP PARAM F/U: HCPCS | Performed by: DERMATOLOGY

## 2019-04-29 PROCEDURE — 3017F COLORECTAL CA SCREEN DOC REV: CPT | Performed by: DERMATOLOGY

## 2019-04-29 PROCEDURE — 4040F PNEUMOC VAC/ADMIN/RCVD: CPT | Performed by: DERMATOLOGY

## 2019-04-29 NOTE — PROGRESS NOTES
History:   Procedure Laterality Date    APPENDECTOMY  02/2000        COLONOSCOPY  2012    DR Therese Boyer, COLONOSCOPY, NO POLYPS, REPEAT 5 YRS    EXTERNAL EAR SURGERY  1/13    left - SCC    KNEE ARTHROSCOPY  1/2000    left    TURP  1/25/16    UPPER GASTROINTESTINAL ENDOSCOPY  [id number]    DR Therese Boyer, SCHATZKI'S RING AND EOSINOHILIC ESOPHAGITIS       Outpatient Medications Marked as Taking for the 4/29/19 encounter (Office Visit) with Andrew Raymond MD   Medication Sig Dispense Refill    atorvastatin (LIPITOR) 40 MG tablet TAKE 1 TABLET BY MOUTH EVERY DAY 90 tablet 2    ranitidine (ZANTAC) 150 MG tablet Take 150 mg by mouth 2 times daily      Cholecalciferol (VITAMIN D3) 5000 UNITS CAPS Take 1 capsule by mouth daily Indications: 2000 units daily per PT       RESTASIS 0.05 % ophthalmic emulsion Place 1 drop into both eyes 2 times daily. 1    aspirin 81 MG EC tablet Take 81 mg by mouth daily.  multivitamin (THERAGRAN) per tablet Take 1 tablet by mouth daily. No Known Allergies      Physical Examination     Gen, well-appearing  The following were examined and determined to be normal: Psych/Neuro, Scalp/hair, Conjunctivae/eyelids, Gums/teeth/lips, Neck, Breast/axilla/chest, Abdomen, Back, Nails/digits and buttocks. RUE, LUE. RLE,   The following were examined and determined to be abnormal: Head/face, LLE  Scars clear  trunk and extremities with scattered brown macules and papules   L Lazar, helices, and face + arms - erythematous roughly scaled macules    Assessment and Plan     1. Hx of NMSC, no signs recurrence  2. Few benign-appearing nevi  - educ re ABCD's of MM   educ sun protection   encouraged skin check yearly (sooner if indicated), self checks    3. AK's - L shins, arms, face and helices  - cont sun protection  - 15 total lesion(s) treated with liquid nitrogen x 2 cycles. Patient educated on risk of blister, hypopigmentation/scar and wound care.    - consider PDT in the future if needed    F/u 1 year.

## 2019-08-02 RX ORDER — ATORVASTATIN CALCIUM 40 MG/1
TABLET, FILM COATED ORAL
Qty: 90 TABLET | Refills: 2 | Status: SHIPPED | OUTPATIENT
Start: 2019-08-02 | End: 2020-05-21

## 2020-02-06 ENCOUNTER — NURSE ONLY (OUTPATIENT)
Dept: FAMILY MEDICINE CLINIC | Age: 70
End: 2020-02-06
Payer: MEDICARE

## 2020-02-06 LAB — HBA1C MFR BLD: 5.9 %

## 2020-02-06 PROCEDURE — 83036 HEMOGLOBIN GLYCOSYLATED A1C: CPT | Performed by: FAMILY MEDICINE

## 2020-04-29 ENCOUNTER — TELEPHONE (OUTPATIENT)
Dept: DERMATOLOGY | Age: 70
End: 2020-04-29

## 2020-04-29 NOTE — TELEPHONE ENCOUNTER
Pt calling states he has a spot on his back that he has concern about noticed it 6 months ago pls return patient call back to see if he can submit photos and set up VV with  pls call back @ [phone] to discuss

## 2020-04-29 NOTE — TELEPHONE ENCOUNTER
Patient's wife noticed a lesion on the patient's back in November and then recently-the area has changed in appearance. Pictures to follow.

## 2020-04-30 ENCOUNTER — VIRTUAL VISIT (OUTPATIENT)
Dept: DERMATOLOGY | Age: 70
End: 2020-04-30
Payer: MEDICARE

## 2020-04-30 PROCEDURE — 99212 OFFICE O/P EST SF 10 MIN: CPT | Performed by: DERMATOLOGY

## 2020-04-30 NOTE — PROGRESS NOTES
TELEHEALTH EVALUATION -- Audio/Visual (During [id number] public health emergency)       Cyndi Daily  1950    72 y.o. male     Date of Visit: 4/30/2020    Due to the COVID-19 pandemic restrictions on close contact interactions, this visit was conducted via telemedicine using doxy. me with audio and video in lieu of a face-to-face visit. Chief Complaint: lesion  Chief Complaint   Patient presents with    Skin Lesion     on back-pictures sent       History of Present Illness:    Last seen 4-29-19. Due for FSE now but deferred d/t COVID pandemic. He notes a concerning lesion on the back. Present for several mos at least and is asx but appears darker in color than it did a few mos ago. No pain or bleeding. Hx of AK's. Few rough spots on the temples but all asx. No other new concerning lesions. + hx of NMSC - BCC and SCC (R thigh and face), last was ~ 2013  No family hx of melanoma. He wears sunscreen - plays golf. Always wears a hat. Often doesn't reapply. Review of Systems:  Gen: Feels well, good sense of health. Skin: No new or changing moles. Past Medical History, Family History, Surgical History, Medications and Allergies reviewed.     Past Medical History:   Diagnosis Date    Actinic keratosis     BPH (benign prostatic hypertrophy)     BPH w/o urinary obs/LUTS     Bulging disc 1990s    lumbar    Chest pain     Contusion of back     Costochondritis 9/7/2011    Degeneration of intervertebral disc, site unspecified     Diarrhea 9/7/2011    Dysphagia, unspecified     Eosinophilic esophagitis [id number]    DR Kiesha Peña, EGD    HCV (hepatitis C virus) 12/1997    Hyperpotassemia     Lumbago     Lumbar strain     acute     Other malignant neoplasm of skin, site unspecified     Pain in joint, shoulder region     Partial Achilles tendon tear     LT     Pharyngitis 9/7/2011    Schatzki's ring [id number]    DR Kiesha Peña, EGD    Screening colonoscopy     colonoscopy (benign

## 2020-05-21 RX ORDER — ATORVASTATIN CALCIUM 40 MG/1
TABLET, FILM COATED ORAL
Qty: 90 TABLET | Refills: 0 | Status: SHIPPED | OUTPATIENT
Start: 2020-05-21 | End: 2020-09-09

## 2020-09-09 RX ORDER — ATORVASTATIN CALCIUM 40 MG/1
TABLET, FILM COATED ORAL
Qty: 30 TABLET | Refills: 0 | Status: SHIPPED | OUTPATIENT
Start: 2020-09-09 | End: 2020-10-15 | Stop reason: SDUPTHER

## 2020-09-16 ENCOUNTER — OFFICE VISIT (OUTPATIENT)
Dept: ORTHOPEDIC SURGERY | Age: 70
End: 2020-09-16
Payer: MEDICARE

## 2020-09-16 VITALS — HEIGHT: 70 IN | WEIGHT: 225 LBS | BODY MASS INDEX: 32.21 KG/M2 | RESPIRATION RATE: 16 BRPM | TEMPERATURE: 97.2 F

## 2020-09-16 PROCEDURE — 1123F ACP DISCUSS/DSCN MKR DOCD: CPT | Performed by: ORTHOPAEDIC SURGERY

## 2020-09-16 PROCEDURE — G8417 CALC BMI ABV UP PARAM F/U: HCPCS | Performed by: ORTHOPAEDIC SURGERY

## 2020-09-16 PROCEDURE — 20600 DRAIN/INJ JOINT/BURSA W/O US: CPT | Performed by: ORTHOPAEDIC SURGERY

## 2020-09-16 PROCEDURE — 3017F COLORECTAL CA SCREEN DOC REV: CPT | Performed by: ORTHOPAEDIC SURGERY

## 2020-09-16 PROCEDURE — 1036F TOBACCO NON-USER: CPT | Performed by: ORTHOPAEDIC SURGERY

## 2020-09-16 PROCEDURE — G8427 DOCREV CUR MEDS BY ELIG CLIN: HCPCS | Performed by: ORTHOPAEDIC SURGERY

## 2020-09-16 PROCEDURE — 4040F PNEUMOC VAC/ADMIN/RCVD: CPT | Performed by: ORTHOPAEDIC SURGERY

## 2020-09-16 PROCEDURE — 99213 OFFICE O/P EST LOW 20 MIN: CPT | Performed by: ORTHOPAEDIC SURGERY

## 2020-09-16 NOTE — Clinical Note
Dear  Edgar Baltazar MD,    Thank you very much for your referral or Mr. Alfredo Liu to me for evaluation and treatment of his Hand & Wrist condition. I appreciate your confidence in me and thank you for allowing me the opportunity to care for your patients. If I can be of any further assistance to you on this or any other patient, please do not hesitate to contact me. Sincerely,    Ciro Gann.  Mateo Yancey MD

## 2020-09-16 NOTE — PROGRESS NOTES
showing evidence of persistent Thumb CMC Degenerative Osteoarthritis after previous treatment. He requests additional treatment at this time. Plan:      I have had a thorough discussion with Mr. Susanne Barton regarding the treatment options available for his worsened Right Thumb CMC joint osteoarthritis, which is causing him functional limitations. I have outlined for Mr. Susanne Barton the benefits and consequences of the various treatment modalities, including a reasonable expectation for the long term success and the likelihood that further more aggressive treatment may be required for his current presenting condition. Based upon our current discussion and a reasonable understating of the options available to him, Mr. Susanne Barton has selected to proceed with  an injection to the right Thumb CMC joint(s). I have outlined for him the nature of the injection, and the pre, maksim and post injection considerations and the appropriate expectations for this injection. I have clearly explained to him that the above outlined treatment plan should not be expected to 'cure' his Thumb CMC osteoarthritis, but we are rather treating the symptoms with which he presents. He has understood that in order to achieve long lasting relief of his symptoms and to prevent future worsening or further damage, that definitive surgical treatment would be required. Mr. Susanne Barton  voiced an appropriate understanding of our discussion, the options available to him, and of the expectations of his selected  treatment. He did wish to proceed with Right Thumb CMC joint injection. Procedure:  Right Thumb CMC Joint injection [second Injection]: After full discussion of the nature of [facility] Joint osteoarthritis and outlining a treatment plan with Mr. Susanne Barton, we discussed the complications, limitations, expectations, alternatives, and risks of injection to the first carpo-metacarpal joint.   He understood this information well and verbally consented to this treatment. The skin of the symptomatic extremity was prepped with Isopropyl Alcohol and under aseptic conditions the  first carpal metacarpal joint was injected with a combination of 1ml of Triamcinolone (40 mg/ml) and Bupivacaine 0.25% without Epinephrine. There was good filling of the joint space. A dry, sterile bandage was applied and he  tolerated the injection without difficulty. I advised him  of the expected response, possible reactions and the instructions for care of the hand. I have also discussed with Mr. Ismael Nguyen the other treatment options available to him for this condition. We have today selected to proceed with treatment by injection with steroid medication. He and I have agreed that if our current course of Injection treatment does not prove to be effective over the short term future, that he will schedule a follow-up appointment to discuss and select an alternate course of therapy including possibly further conservative treatment or surgical treatment. Mr. Ismael Nguyen has been given a full verbal list of instructions and precautions related to his present condition. I have asked him to followup with me in the office at the prescribed time. He is also specifically requested to call or return to the office sooner if his symptoms change or worsen prior to the next scheduled appointment.

## 2020-09-16 NOTE — PATIENT INSTRUCTIONS
Information & Instructions   After Finger, Hand, Wrist, or Elbow Injection    Luis Miguel Tidwell MD    You have received an injection of local anesthetic (Bupivicaine without Epinephrine) for comfort & a steroid (Kenalog) for its strong anti-inflammatory effects. In order to give the medication a chance to reduce your inflammation and discomfort, it is recommended that you take it easy for a day or so. You may use your hand and arm as you feel comfortable, but you should avoid highly strenuous activity and heavy use for several days. Relief from the injection will often not begin for several days, and you may not feel full relief for up to one month. It is not uncommon to experience some local discomfort or pain at or around the injection site for a few days. To relieve these symptoms you may do the following if you feel necessary:       Apply ice to the affected area 20 minutes on and 20 minutes off. Do not apply ice directly to the skin. Use a thin layer (T-shirt, pillowcase, towel, etc.) to protect the skin. - If allowed by your other medical physicians, you may take -     2 Tylenol extra strength tablets every 4-6 hours       1-2 Aleve tablets twice a day     2-3 Advil tablets two to three times a day    If you are diabetic, the steroid medication may increase your blood sugar, so you are advised to monitor your sugar more closely so you can adjust it accordingly for a few days following your injection. If you need assistance with the control of you blood sugar, please contact you primary care physician for further advice. I will request that you please call the office one month after your injection at [phone] if you have not experienced relief of your symptoms (unless I have instructed you otherwise). If your injection has given you good relief of you symptoms as expected, then you only need to call the office if your symptoms return.

## 2020-10-05 RX ORDER — ATORVASTATIN CALCIUM 40 MG/1
TABLET, FILM COATED ORAL
Qty: 30 TABLET | Refills: 0 | OUTPATIENT
Start: 2020-10-05

## 2020-10-15 ENCOUNTER — TELEPHONE (OUTPATIENT)
Dept: FAMILY MEDICINE CLINIC | Age: 70
End: 2020-10-15

## 2020-10-15 RX ORDER — ATORVASTATIN CALCIUM 40 MG/1
TABLET, FILM COATED ORAL
Qty: 90 TABLET | Refills: 1 | Status: SHIPPED | OUTPATIENT
Start: 2020-10-15 | End: 2021-04-29

## 2020-10-20 ENCOUNTER — VIRTUAL VISIT (OUTPATIENT)
Dept: FAMILY MEDICINE CLINIC | Age: 70
End: 2020-10-20
Payer: MEDICARE

## 2020-10-20 PROCEDURE — 99213 OFFICE O/P EST LOW 20 MIN: CPT | Performed by: FAMILY MEDICINE

## 2020-10-20 RX ORDER — FAMOTIDINE 20 MG/1
20 TABLET, FILM COATED ORAL DAILY
COMMUNITY

## 2020-10-20 ASSESSMENT — PATIENT HEALTH QUESTIONNAIRE - PHQ9
SUM OF ALL RESPONSES TO PHQ QUESTIONS 1-9: 0
1. LITTLE INTEREST OR PLEASURE IN DOING THINGS: 0
SUM OF ALL RESPONSES TO PHQ9 QUESTIONS 1 & 2: 0
SUM OF ALL RESPONSES TO PHQ QUESTIONS 1-9: 0
2. FEELING DOWN, DEPRESSED OR HOPELESS: 0
SUM OF ALL RESPONSES TO PHQ QUESTIONS 1-9: 0

## 2020-10-20 NOTE — PROGRESS NOTES
10/20/2020    TELEHEALTH EVALUATION -- Audio/Visual (During [facility]QU[facility]- public health emergency)    HPI:    Antionette Em (:  1950) has requested an audio/video evaluation for the following concern(s):    Chief Complaint   Patient presents with    Hyperlipidemia     CHOLESTEROL ROUTINE FOLLOW UP     BP Readings from Last 3 Encounters:   19 124/72   18 130/72   18 120/74     Pulse Readings from Last 3 Encounters:   19 76   18 62   18 68     Wt Readings from Last 3 Encounters:   20 225 lb (102.1 kg)   19 239 lb (108.4 kg)   18 235 lb (106.6 kg)   escaped covid so far  occ walking/ biking/ golfing about the same  Sleeping okay  Diet/ weight stable  occ bad night of sleeping. No trouble w/ acid reflux lately. On pepcid once daily 20mg   Still on baby asa, mvi, vit d  O/w feels pretty good. Past Surgical History:   Procedure Laterality Date    APPENDECTOMY  2000        COLONOSCOPY      DR Bethanie Lopez, COLONOSCOPY, NO POLYPS, REPEAT 5 YRS    EXTERNAL EAR SURGERY      left - SCC    KNEE ARTHROSCOPY  2000    left    TURP  16    UPPER GASTROINTESTINAL ENDOSCOPY  [id number]    DR Bethanie Lopez, SCHATZKI'S RING AND EOSINOHILIC ESOPHAGITIS     Stress test ok 2 years ago  Some oa pain but stable  Sees derm regularly  Review of Systems    Prior to Visit Medications    Medication Sig Taking? Authorizing Provider   famotidine (PEPCID) 20 MG tablet Take 20 mg by mouth daily Yes Historical Provider, MD   atorvastatin (LIPITOR) 40 MG tablet TAKE 1 TABLET BY MOUTH EVERY DAY Yes Sarai Rueda APRN - CNP   Cholecalciferol (VITAMIN D3) 5000 UNITS CAPS Take 1 capsule by mouth daily Indications: 2000 units daily per PT  Yes Historical Provider, MD   RESTASIS 0.05 % ophthalmic emulsion Place 1 drop into both eyes 2 times daily. Yes Historical Provider, MD   aspirin 81 MG EC tablet Take 81 mg by mouth daily.    Yes Historical Provider, MD multivitamin (THERAGRAN) per tablet Take 1 tablet by mouth daily. Yes Historical Provider, MD       Social History     Tobacco Use    Smoking status: Former Smoker     Packs/day: 1.00     Years: 19.00     Pack years: 19.00     Types: Cigarettes     Last attempt to quit: 1990     Years since quittin.8    Smokeless tobacco: Never Used    Tobacco comment: Educated to avoid tobacco.   Substance Use Topics    Alcohol use: Yes     Alcohol/week: 1.0 standard drinks     Types: 1 Cans of beer per week     Comment: 3 to 4 times per week    Drug use: No        PHYSICAL EXAMINATION:  [ INSTRUCTIONS:  \"[x]\" Indicates a positive item  \"[]\" Indicates a negative item  -- DELETE ALL ITEMS NOT EXAMINED]  Vital Signs: (As obtained by patient/caregiver or practitioner observation)    Blood pressure-  Heart rate-    Respiratory rate-    Temperature-  Pulse oximetry-     Constitutional: [x] Appears well-developed and well-nourished [] No apparent distress      [] Abnormal-   Mental status  [x] Alert and awake  [] Oriented to person/place/time []Able to follow commands      Eyes:  EOM    []  Normal  [] Abnormal-  Sclera  []  Normal  [] Abnormal -         Discharge []  None visible  [] Abnormal -    HENT:   [x] Normocephalic, atraumatic.   [] Abnormal   [] Mouth/Throat: Mucous membranes are moist.     External Ears [] Normal  [] Abnormal-     Neck: [] No visualized mass     Pulmonary/Chest: [x] Respiratory effort normal.  [] No visualized signs of difficulty breathing or respiratory distress        [] Abnormal-      Musculoskeletal:   [] Normal gait with no signs of ataxia         [] Normal range of motion of neck        [] Abnormal-       Neurological:        [x] No Facial Asymmetry (Cranial nerve 7 motor function) (limited exam to video visit)          [] No gaze palsy        [] Abnormal-         Skin:        [x] No significant exanthematous lesions or discoloration noted on facial skin         [] Abnormal- Psychiatric:       [x] Normal Affect [] No Hallucinations        [] Abnormal-     Other pertinent observable physical exam findings-     ASSESSMENT/PLAN:   Diagnosis Orders   1. Benign prostatic hyperplasia with nocturia  PSA, Prostatic Specific Antigen   2. Hyperlipidemia, unspecified hyperlipidemia type  Comprehensive Metabolic Panel    Lipid Panel   3. Hyperglycemia  Comprehensive Metabolic Panel    Hemoglobin A1C   4. Vitamin D deficiency  Vitamin D 25 Hydroxy   5. Gastroesophageal reflux disease, unspecified whether esophagitis present       Generally doing well - pepcid once daily for patel control  Cont statin - recheck fasting labs after first of year  Diet/ activity dw/ pt - weight stable  Feeling well overall  F/u pending labs  Cont asa 81, mvi, vit d for now pending labs  Consider ASVD score to evaluate for need for asa    No follow-ups on file. Jag Hawk is a 72 y.o. male being evaluated by a Virtual Visit (video visit) encounter to address concerns as mentioned above. A caregiver was present when appropriate. Due to this being a TeleHealth encounter (During [place] [address] public health emergency), evaluation of the following organ systems was limited: Vitals/Constitutional/EENT/Resp/CV/GI//MS/Neuro/Skin/Heme-Lymph-Imm. Pursuant to the emergency declaration under the [facility], [address] [place] [address] authority and the [employer] Act, this Virtual Visit was conducted with patient's (and/or legal guardian's) consent, to reduce the patient's risk of exposure to COVID-19 and provide necessary medical care. The patient (and/or legal guardian) has also been advised to contact this office for worsening conditions or problems, and seek emergency medical treatment and/or call 911 if deemed necessary.      Patient identification was verified at the start of the visit: Yes    Total time spent on this encounter: 11-20 minutes    Services were provided through a video synchronous discussion virtually to substitute for in-person clinic visit. Patient and provider were located at their individual homes. --Hay Sanchez MD on 10/20/2020 at 4:28 PM    An electronic signature was used to authenticate this note.

## 2020-10-30 ENCOUNTER — OFFICE VISIT (OUTPATIENT)
Dept: PRIMARY CARE CLINIC | Age: 70
End: 2020-10-30
Payer: MEDICARE

## 2020-10-30 PROCEDURE — 99211 OFF/OP EST MAY X REQ PHY/QHP: CPT | Performed by: NURSE PRACTITIONER

## 2020-10-30 NOTE — PROGRESS NOTES
Wilbur Wheeler received a viral test for COVID-19. They were educated on isolation and quarantine as appropriate. For any symptoms, they were directed to seek care from their PCP, given contact information to establish with a doctor, directed to an urgent care or the emergency room.

## 2020-11-01 LAB — SARS-COV-2, NAA: NOT DETECTED

## 2020-11-02 ENCOUNTER — TELEPHONE (OUTPATIENT)
Dept: FAMILY MEDICINE CLINIC | Age: 70
End: 2020-11-02

## 2020-11-05 ENCOUNTER — HOSPITAL ENCOUNTER (OUTPATIENT)
Dept: GENERAL RADIOLOGY | Age: 70
Discharge: HOME | End: 2020-11-05
Payer: MEDICARE

## 2020-11-05 ENCOUNTER — HOSPITAL ENCOUNTER (OUTPATIENT)
Age: 70
Discharge: HOME | End: 2020-11-05
Payer: MEDICARE

## 2020-11-05 LAB
BASOPHILS ABSOLUTE: 0.1 K/UL (ref 0–0.2)
BASOPHILS RELATIVE PERCENT: 1.2 %
EOSINOPHILS ABSOLUTE: 0.1 K/UL (ref 0–0.6)
EOSINOPHILS RELATIVE PERCENT: 1.2 %
HCT VFR BLD CALC: 39.9 % (ref 40.5–52.5)
HEMOGLOBIN: 13.5 G/DL (ref 13.5–17.5)
LYMPHOCYTES ABSOLUTE: 1.2 K/UL (ref 1–5.1)
LYMPHOCYTES RELATIVE PERCENT: 12.6 %
MCH RBC QN AUTO: 32 PG (ref 26–34)
MCHC RBC AUTO-ENTMCNC: 33.8 G/DL (ref 31–36)
MCV RBC AUTO: 94.6 FL (ref 80–100)
MONOCYTES ABSOLUTE: 1.5 K/UL (ref 0–1.3)
MONOCYTES RELATIVE PERCENT: 16.2 %
NEUTROPHILS ABSOLUTE: 6.4 K/UL (ref 1.7–7.7)
NEUTROPHILS RELATIVE PERCENT: 68.8 %
PDW BLD-RTO: 12.7 % (ref 12.4–15.4)
PLATELET # BLD: 370 K/UL (ref 135–450)
PMV BLD AUTO: 8.1 FL (ref 5–10.5)
RBC # BLD: 4.22 M/UL (ref 4.2–5.9)
SEDIMENTATION RATE, ERYTHROCYTE: 69 MM/HR (ref 0–20)
TSH REFLEX: 2.09 UIU/ML (ref 0.27–4.2)
WBC # BLD: 9.3 K/UL (ref 4–11)

## 2020-11-05 PROCEDURE — 84155 ASSAY OF PROTEIN SERUM: CPT

## 2020-11-05 PROCEDURE — 84443 ASSAY THYROID STIM HORMONE: CPT

## 2020-11-05 PROCEDURE — 86038 ANTINUCLEAR ANTIBODIES: CPT

## 2020-11-05 PROCEDURE — 85652 RBC SED RATE AUTOMATED: CPT

## 2020-11-05 PROCEDURE — 84165 PROTEIN E-PHORESIS SERUM: CPT

## 2020-11-05 PROCEDURE — 85025 COMPLETE CBC W/AUTO DIFF WBC: CPT

## 2020-11-05 PROCEDURE — 36415 COLL VENOUS BLD VENIPUNCTURE: CPT

## 2020-11-05 PROCEDURE — 71046 X-RAY EXAM CHEST 2 VIEWS: CPT

## 2020-11-05 PROCEDURE — 86140 C-REACTIVE PROTEIN: CPT

## 2020-11-06 ENCOUNTER — OFFICE VISIT (OUTPATIENT)
Dept: PRIMARY CARE CLINIC | Age: 70
End: 2020-11-06
Payer: MEDICARE

## 2020-11-06 LAB
ALBUMIN SERPL-MCNC: 3.1 G/DL (ref 3.1–4.9)
ALPHA-1-GLOBULIN: 0.4 G/DL (ref 0.2–0.4)
ALPHA-2-GLOBULIN: 1 G/DL (ref 0.4–1.1)
ANTI-NUCLEAR ANTIBODY (ANA): NEGATIVE
BETA GLOBULIN: 1 G/DL (ref 0.9–1.6)
C-REACTIVE PROTEIN: 43.9 MG/L (ref 0–5.1)
GAMMA GLOBULIN: 1.3 G/DL (ref 0.6–1.8)
INFLUENZA A ANTIGEN, POC: NORMAL
INFLUENZA B ANTIGEN, POC: NORMAL
SPE/IFE INTERPRETATION: NORMAL
TOTAL PROTEIN: 6.7 G/DL (ref 6.4–8.2)

## 2020-11-06 PROCEDURE — 87804 INFLUENZA ASSAY W/OPTIC: CPT | Performed by: NURSE PRACTITIONER

## 2020-11-06 PROCEDURE — 99211 OFF/OP EST MAY X REQ PHY/QHP: CPT | Performed by: NURSE PRACTITIONER

## 2020-11-06 NOTE — PROGRESS NOTES
Javier Harden received a viral test for COVID-19. They were educated on isolation and quarantine as appropriate. For any symptoms, they were directed to seek care from their PCP, given contact information to establish with a doctor, directed to an urgent care or the emergency room.

## 2020-11-06 NOTE — PATIENT INSTRUCTIONS

## 2020-11-09 LAB — SARS-COV-2, NAA: NOT DETECTED

## 2021-01-19 ENCOUNTER — TELEPHONE (OUTPATIENT)
Dept: FAMILY MEDICINE CLINIC | Age: 71
End: 2021-01-19

## 2021-01-19 DIAGNOSIS — Z13.6 ENCOUNTER FOR ABDOMINAL AORTIC ANEURYSM (AAA) SCREENING: ICD-10-CM

## 2021-01-19 DIAGNOSIS — Z11.59 ENCOUNTER FOR HEPATITIS C SCREENING TEST FOR LOW RISK PATIENT: ICD-10-CM

## 2021-01-19 DIAGNOSIS — R79.82 ELEVATED C-REACTIVE PROTEIN (CRP): Primary | ICD-10-CM

## 2021-01-19 NOTE — TELEPHONE ENCOUNTER
Pt is going to have labs drawn and would like  A CC reactive protein tested again.      Please place orders -     Pt @  [phone]

## 2021-01-19 NOTE — TELEPHONE ENCOUNTER
Pt informed.   Pt states that he noticed on HM that he also needs Hep C screen and AAA screen, wants orders sent to [facility], he will call tomorrow to schedule AAA and labs./mv

## 2021-01-22 DIAGNOSIS — E55.9 VITAMIN D DEFICIENCY: ICD-10-CM

## 2021-01-22 DIAGNOSIS — E78.5 HYPERLIPIDEMIA, UNSPECIFIED HYPERLIPIDEMIA TYPE: ICD-10-CM

## 2021-01-22 DIAGNOSIS — R35.1 BENIGN PROSTATIC HYPERPLASIA WITH NOCTURIA: ICD-10-CM

## 2021-01-22 DIAGNOSIS — R79.82 ELEVATED C-REACTIVE PROTEIN (CRP): ICD-10-CM

## 2021-01-22 DIAGNOSIS — Z11.59 ENCOUNTER FOR HEPATITIS C SCREENING TEST FOR LOW RISK PATIENT: ICD-10-CM

## 2021-01-22 DIAGNOSIS — R73.9 HYPERGLYCEMIA: ICD-10-CM

## 2021-01-22 DIAGNOSIS — N40.1 BENIGN PROSTATIC HYPERPLASIA WITH NOCTURIA: ICD-10-CM

## 2021-01-22 LAB
A/G RATIO: 1.5 (ref 1.1–2.2)
ALBUMIN SERPL-MCNC: 4.2 G/DL (ref 3.4–5)
ALP BLD-CCNC: 55 U/L (ref 40–129)
ALT SERPL-CCNC: 24 U/L (ref 10–40)
ANION GAP SERPL CALCULATED.3IONS-SCNC: 12 MMOL/L (ref 3–16)
AST SERPL-CCNC: 28 U/L (ref 15–37)
BILIRUB SERPL-MCNC: 0.3 MG/DL (ref 0–1)
BUN BLDV-MCNC: 14 MG/DL (ref 7–20)
C-REACTIVE PROTEIN: 6 MG/L (ref 0–5.1)
CALCIUM SERPL-MCNC: 9.1 MG/DL (ref 8.3–10.6)
CHLORIDE BLD-SCNC: 99 MMOL/L (ref 99–110)
CHOLESTEROL, TOTAL: 141 MG/DL (ref 0–199)
CO2: 27 MMOL/L (ref 21–32)
CREAT SERPL-MCNC: 0.9 MG/DL (ref 0.8–1.3)
GFR AFRICAN AMERICAN: >60
GFR NON-AFRICAN AMERICAN: >60
GLOBULIN: 2.8 G/DL
GLUCOSE BLD-MCNC: 103 MG/DL (ref 70–99)
HDLC SERPL-MCNC: 52 MG/DL (ref 40–60)
HEPATITIS C ANTIBODY INTERPRETATION: NORMAL
LDL CHOLESTEROL CALCULATED: 74 MG/DL
POTASSIUM SERPL-SCNC: 4.5 MMOL/L (ref 3.5–5.1)
SODIUM BLD-SCNC: 138 MMOL/L (ref 136–145)
TOTAL PROTEIN: 7 G/DL (ref 6.4–8.2)
TRIGL SERPL-MCNC: 73 MG/DL (ref 0–150)
VITAMIN D 25-HYDROXY: 43.5 NG/ML
VLDLC SERPL CALC-MCNC: 15 MG/DL

## 2021-01-23 LAB
ESTIMATED AVERAGE GLUCOSE: 119.8 MG/DL
HBA1C MFR BLD: 5.8 %

## 2021-01-26 LAB — PROSTATE SPECIFIC ANTIGEN: 3.29 NG/ML (ref 0–4)

## 2021-02-10 ENCOUNTER — HOSPITAL ENCOUNTER (OUTPATIENT)
Dept: ULTRASOUND IMAGING | Age: 71
Discharge: HOME | End: 2021-02-10
Payer: MEDICARE

## 2021-02-10 DIAGNOSIS — Z13.6 ENCOUNTER FOR ABDOMINAL AORTIC ANEURYSM (AAA) SCREENING: ICD-10-CM

## 2021-02-10 PROCEDURE — 76706 US ABDL AORTA SCREEN AAA: CPT

## 2021-04-06 ENCOUNTER — OFFICE VISIT (OUTPATIENT)
Dept: DERMATOLOGY | Age: 71
End: 2021-04-06
Payer: MEDICARE

## 2021-04-06 VITALS — TEMPERATURE: 97.5 F

## 2021-04-06 DIAGNOSIS — D48.5 NEOPLASM OF UNCERTAIN BEHAVIOR OF SKIN: ICD-10-CM

## 2021-04-06 DIAGNOSIS — D22.9 MULTIPLE NEVI: ICD-10-CM

## 2021-04-06 DIAGNOSIS — L82.1 SEBORRHEIC KERATOSIS: ICD-10-CM

## 2021-04-06 DIAGNOSIS — L57.0 AK (ACTINIC KERATOSIS): ICD-10-CM

## 2021-04-06 DIAGNOSIS — Z85.828 HISTORY OF NONMELANOMA SKIN CANCER: Primary | ICD-10-CM

## 2021-04-06 PROCEDURE — 99214 OFFICE O/P EST MOD 30 MIN: CPT | Performed by: DERMATOLOGY

## 2021-04-06 PROCEDURE — 3017F COLORECTAL CA SCREEN DOC REV: CPT | Performed by: DERMATOLOGY

## 2021-04-06 PROCEDURE — 1123F ACP DISCUSS/DSCN MKR DOCD: CPT | Performed by: DERMATOLOGY

## 2021-04-06 PROCEDURE — 11102 TANGNTL BX SKIN SINGLE LES: CPT | Performed by: DERMATOLOGY

## 2021-04-06 PROCEDURE — G8417 CALC BMI ABV UP PARAM F/U: HCPCS | Performed by: DERMATOLOGY

## 2021-04-06 PROCEDURE — 1036F TOBACCO NON-USER: CPT | Performed by: DERMATOLOGY

## 2021-04-06 PROCEDURE — 4040F PNEUMOC VAC/ADMIN/RCVD: CPT | Performed by: DERMATOLOGY

## 2021-04-06 PROCEDURE — G8427 DOCREV CUR MEDS BY ELIG CLIN: HCPCS | Performed by: DERMATOLOGY

## 2021-04-06 PROCEDURE — 17004 DESTROY PREMAL LESIONS 15/>: CPT | Performed by: DERMATOLOGY

## 2021-04-06 RX ORDER — ZINC GLUCONATE 50 MG
50 TABLET ORAL DAILY
COMMUNITY
End: 2022-01-12

## 2021-04-06 RX ORDER — MULTIVIT WITH MINERALS/LUTEIN
250 TABLET ORAL DAILY
COMMUNITY
End: 2022-01-12

## 2021-04-06 NOTE — PROGRESS NOTES
[facility]  Rehan Cruz MD  [address] [place] [address]  1950    80 y.o. male     Date of Visit: 4/6/2021    Chief Complaint: f/u skin cancer and AK's  Chief Complaint   Patient presents with    Skin Exam     FSE         HX: NMSC     Last seen: ~ 2019 in office and 2020 VV    History of Present Illness:    He lives on [facility] and [address] [place] [address]. 1. Here for a full skin check for hx of NMSC. No probs with previous sites. New concerns noted below. 2. He has scattered asx brown lesions on the trunk and extremities, present for many years; no change in size/shape/color of any lesions; no bleeding lesions. 3. He has scattered rough lesions on the face, helices and arms. 4. He has a concerning pigmented lesion on the lower back.    + hx of NMSC - BCC and SCC (R thigh and face), last was ~ 2013  No family hx of melanoma. He wears sunscreen - plays golf. Always wears a hat. Often doesn't reapply. Review of Systems:  Gen: Feels well, good sense of health. Skin: No changing moles or lesions. Past Medical History, Family History, Surgical History, Medications and Allergies reviewed.     Past Medical History:   Diagnosis Date    Actinic keratosis     BPH (benign prostatic hypertrophy)     BPH w/o urinary obs/LUTS     Bulging disc 1990s    lumbar    Chest pain     Contusion of back     Costochondritis 9/7/2011    Degeneration of intervertebral disc, site unspecified     Diarrhea 9/7/2011    Dysphagia, unspecified     Eosinophilic esophagitis [id number]    DR Breanna Nj, EGD    HCV (hepatitis C virus) 12/1997    Hyperpotassemia     Lumbago     Lumbar strain     acute     Other malignant neoplasm of skin, site unspecified     Pain in joint, shoulder region     Partial Achilles tendon tear     LT     Pharyngitis 9/7/2011    Schatzki's ring [id number]    DR Breanna Nj, EGD    Screening colonoscopy     colonoscopy (benign polyps) every 5 years 3/2007   Lola Lee Stricture and stenosis of esophagus        Past Surgical History:   Procedure Laterality Date    APPENDECTOMY  02/2000        COLONOSCOPY  2012    DR Alfonzo Cochran, COLONOSCOPY, NO POLYPS, REPEAT 5 YRS    EXTERNAL EAR SURGERY  1/13    left - SCC    KNEE ARTHROSCOPY  1/2000    left    TURP  1/25/16    UPPER GASTROINTESTINAL ENDOSCOPY  [id number]    DR Alfonzo Cochran, SCHATZKI'S RING AND EOSINOHILIC ESOPHAGITIS       Outpatient Medications Marked as Taking for the 4/6/21 encounter (Office Visit) with Kale Henderson MD   Medication Sig Dispense Refill    Ascorbic Acid (VITAMIN C) 250 MG tablet Take 250 mg by mouth daily      zinc gluconate 50 MG tablet Take 50 mg by mouth daily      famotidine (PEPCID) 20 MG tablet Take 20 mg by mouth daily      atorvastatin (LIPITOR) 40 MG tablet TAKE 1 TABLET BY MOUTH EVERY DAY 90 tablet 1    Cholecalciferol (VITAMIN D3) 5000 UNITS CAPS Take 1 capsule by mouth daily Indications: 2000 units daily per PT       RESTASIS 0.05 % ophthalmic emulsion Place 1 drop into both eyes 2 times daily. 1    aspirin 81 MG EC tablet Take 81 mg by mouth daily.  multivitamin (THERAGRAN) per tablet Take 1 tablet by mouth daily. No Known Allergies      Physical Examination     Gen, well-appearing  FSE performed except for underwear-covered areas    Scars clear  trunk and extremities with scattered brown macules and papules   helices, and face + arms - erythematous roughly scaled macules  Central lower back with mottled pink and brown macule/thin papule    Assessment and Plan     1. Hx of NMSC, no signs recurrence  2. Few benign-appearing nevi and SK's  - educ re ABCD's of MM   educ sun protection   encouraged skin check yearly (sooner if indicated), self checks    3. AK's - arms, face and helices  - cont sun protection  - 15 total lesion(s) treated with liquid nitrogen x 2 cycles. Patient educated on risk of blister, hypopigmentation/scar and wound care.    - plan for efudex + vit D this coming winter for the remaining lesions on the [place] and temples; ed irritation, erythema and photosensitivity and expectations    4. Central lower back - r/o dysplastic nevus  - Shave biopsy performed after verbal consent obtained. Patient educated regarding risk of bleeding, infection, scar and educated on wound care. Skin cleansed with alcohol pad and site anesthetized with lido + epi. Aluminum chloride applied to site for hemostasis. Petrolatum ointment and bandage applied. Specimen bottle labeled with patient information and site and specimen sent to [facility]. F/u 1 year.

## 2021-04-08 LAB — DERMATOLOGY PATHOLOGY REPORT: NORMAL

## 2021-04-19 ENCOUNTER — TELEPHONE (OUTPATIENT)
Dept: FAMILY MEDICINE CLINIC | Age: 71
End: 2021-04-19

## 2021-04-19 NOTE — TELEPHONE ENCOUNTER
I SENT THE PT A [employer] MESSAGE TO [facility] TO SCHEDULE AN AWV SINCE PHYSICALS ARE NOT COVERED BY MEDICARE.   [facility]

## 2021-04-29 RX ORDER — ATORVASTATIN CALCIUM 40 MG/1
TABLET, FILM COATED ORAL
Qty: 90 TABLET | Refills: 2 | Status: SHIPPED | OUTPATIENT
Start: 2021-04-29 | End: 2022-02-28

## 2022-01-12 ENCOUNTER — OFFICE VISIT (OUTPATIENT)
Dept: ORTHOPEDIC SURGERY | Age: 72
End: 2022-01-12
Payer: MEDICARE

## 2022-01-12 VITALS — BODY MASS INDEX: 31.5 KG/M2 | WEIGHT: 225 LBS | HEIGHT: 71 IN

## 2022-01-12 DIAGNOSIS — S63.501A SPRAIN OF RIGHT WRIST, INITIAL ENCOUNTER: ICD-10-CM

## 2022-01-12 DIAGNOSIS — M25.531 RIGHT WRIST PAIN: Primary | ICD-10-CM

## 2022-01-12 PROCEDURE — G8484 FLU IMMUNIZE NO ADMIN: HCPCS | Performed by: PHYSICIAN ASSISTANT

## 2022-01-12 PROCEDURE — G8427 DOCREV CUR MEDS BY ELIG CLIN: HCPCS | Performed by: PHYSICIAN ASSISTANT

## 2022-01-12 PROCEDURE — 4004F PT TOBACCO SCREEN RCVD TLK: CPT | Performed by: PHYSICIAN ASSISTANT

## 2022-01-12 PROCEDURE — G8417 CALC BMI ABV UP PARAM F/U: HCPCS | Performed by: PHYSICIAN ASSISTANT

## 2022-01-12 PROCEDURE — 1123F ACP DISCUSS/DSCN MKR DOCD: CPT | Performed by: PHYSICIAN ASSISTANT

## 2022-01-12 PROCEDURE — 3017F COLORECTAL CA SCREEN DOC REV: CPT | Performed by: PHYSICIAN ASSISTANT

## 2022-01-12 PROCEDURE — 4040F PNEUMOC VAC/ADMIN/RCVD: CPT | Performed by: PHYSICIAN ASSISTANT

## 2022-01-12 PROCEDURE — L3908 WHO COCK-UP NONMOLDE PRE OTS: HCPCS | Performed by: PHYSICIAN ASSISTANT

## 2022-01-12 PROCEDURE — 99213 OFFICE O/P EST LOW 20 MIN: CPT | Performed by: PHYSICIAN ASSISTANT

## 2022-01-12 NOTE — PROGRESS NOTES
Subjective:      Patient ID: Hermes Rincon is a 80 y.o.  male. He is here in the [address] [place] [address] for an initial evaluation of right wrist pain. Onset of symptoms 4 days ago after an injury. Fell on ice. Pain is moderate. Location of pain- right wrist.  Pain is worse with movement. Pain improves with ice and elevation. There is not associated numbness/ tingling. Previous treatments have included: use of a splint meant to be used for base of the thumb CMC joint arthritis with mild improvement. Review of Systems:  I have reviewed the clinically relevant past medical history, medications, allergies, family history, social history, and 13 point Review of Systems from the patient's recent history form & documented any details relevant to today's presenting complaints in the history above. The patient's self-reported past medical history, medications, allergies, family history, social history, and Review of Systems form from today's date have been scanned into the chart under the \"Media\" tab.         Past Medical History:   Diagnosis Date    Actinic keratosis     BPH (benign prostatic hypertrophy)     BPH w/o urinary obs/LUTS     Bulging disc 1990s    lumbar    Chest pain     Contusion of back     Costochondritis 9/7/2011    Degeneration of intervertebral disc, site unspecified     Diarrhea 9/7/2011    Dysphagia, unspecified     Eosinophilic esophagitis [id number]    DR Jessee Lino, EGD    HCV (hepatitis C virus) 12/1997    Hyperpotassemia     Lumbago     Lumbar strain     acute     Other malignant neoplasm of skin, site unspecified     Pain in joint, shoulder region     Partial Achilles tendon tear     LT     Pharyngitis 9/7/2011    Schatzki's ring [id number]    DR Jessee Lino, EGD    Screening colonoscopy     colonoscopy (benign polyps) every 5 years 3/2007    Stricture and stenosis of esophagus        Family History   Problem Relation Age of Onset    Depression Mother     Other Mother         CLAVICLE FX    Cancer Father         rectal    Alcohol Abuse Father     Other Father         vasc. heart valve age 61    High Blood Pressure Brother     Other Brother 48        ALS    Heart Disease Maternal Grandmother     Heart Disease Maternal Grandfather     Cancer Maternal Grandfather         colon    Tuberculosis Paternal Grandfather     Cancer Paternal Grandfather        Past Surgical History:   Procedure Laterality Date    APPENDECTOMY  2000        COLONOSCOPY      DR Yang Grandchild, COLONOSCOPY, NO POLYPS, REPEAT 5 YRS    EXTERNAL EAR SURGERY      left - SCC    KNEE ARTHROSCOPY  2000    left    TURP  17    UPPER GASTROINTESTINAL ENDOSCOPY  [id number]    DR Yang Grandchild, SCHATZKI'S RING AND EOSINOHILIC ESOPHAGITIS       Social History     Occupational History    Occupation: Retired   Tobacco Use    Smoking status: Current Some Day Smoker     Packs/day: 1.00     Years: 19.00     Pack years: 19.00     Types: Cigarettes     Last attempt to quit:      Years since quittin.0    Smokeless tobacco: Never Used    Tobacco comment: smokes cigars occasionally   Vaping Use    Vaping Use: Never used   Substance and Sexual Activity    Alcohol use:  Yes     Alcohol/week: 1.0 standard drink     Types: 1 Cans of beer per week     Comment: 3 to 4 times per week    Drug use: No    Sexual activity: Not on file       Current Outpatient Medications   Medication Sig Dispense Refill    atorvastatin (LIPITOR) 40 MG tablet TAKE 1 TABLET BY MOUTH EVERY DAY 90 tablet 2    Ascorbic Acid (VITAMIN C) 250 MG tablet Take 250 mg by mouth daily      zinc gluconate 50 MG tablet Take 50 mg by mouth daily      famotidine (PEPCID) 20 MG tablet Take 20 mg by mouth daily      Cholecalciferol (VITAMIN D3) 5000 UNITS CAPS Take 1 capsule by mouth daily Indications: 2000 units daily per PT       RESTASIS 0.05 % ophthalmic emulsion Place 1 drop into both eyes 2 times daily. 1    aspirin 81 MG EC tablet Take 81 mg by mouth daily.  multivitamin (THERAGRAN) per tablet Take 1 tablet by mouth daily. No current facility-administered medications for this visit. Objective:     Samira Lang  is  oriented to person, place and time, pleasant, well nourished, developed and in no acute distress. Ht 5' 11\" (1.803 m)   Wt 225 lb (102.1 kg)   BMI 31.38 kg/m²      Right Wrist:  There is mild swelling. There is no skeletal deformity. There is mild tenderness over dorsal/ulnar aspect of the right wrist.  Wrist range of motion is somewhat limited by pain and or swelling. FDS,FDP and Common Extensor tendon function is intact to each digit. FPL and EPL tendon function is intact to the thumb. Skin color, texture, turgor is normal.  No rashes or lesions found of the injured extremity. Vascular exam shows normal  And good capillary refill bilaterally. Sensation is subjectively normal in the whole hand. Digits are normally sensate. X Rays: performed in the office today:   AP, Lateral and Oblique of the Right Wrist:  Radiographs demonstrate normal bony alignment of the wrist.   There is questionable small avulsion fracture off the carpal bone dorsally. There is no radiographic evidence of an acute displaced fracture or dislocation. Advanced first [facility] joint arthritis. Diagnosis:       ICD-10-CM    1. Right wrist pain  M25.531 XR WRIST RIGHT (MIN 3 VIEWS)     Kiara Quiroz Titan Wrist Short Brace   2. Sprain of right wrist, initial encounter  S63.501A         Assessment/plan:       Assessment:  Sprain of the right wrist.  Possible avulsion fracture dorsal wrist.    I had an extensive discussion with Mr. Samira Lang and/or family regarding the natural history, etiology, and long term consequences of his condition. I have presented reasonable alternatives to the patient's proposed care, treatment, and services.   Risks and benefits of the treatment options also reviewed in detail. I have outlined a treatment plan with them. He has had full opportunity to ask his questions. I have answered them all to his satisfaction. I feel that Mr. Viji Contreras understands our discussion today. Plan:  Medications-   OTC NSAIDS discussed. He  was advised that NSAID-type medications have several important potential side effects: gastrointestinal irritation including hemorrhage, cardiac events and renal injuries. He was asked to take the medication with food and to stop if he experiences any GI upset. I asked him to call for vomiting, abdominal pain or black/bloody stools. He should have renal function testing per his medical provider periodically. He expresses understanding of these risks associated with NSAID use and questions were answered. Procedures-immobilization recommended with cock-up wrist splints. Procedures    Kiara Quiroz Titan Wrist Short Brace     Patient was prescribed a Kiara Quiroz Titan Wrist Orthosis. The right wrist will require stabilization / immobilization from this semi-rigid / rigid orthosis to improve their function. The orthosis will assist in protecting the affected area, provide functional support and facilitate healing. The patient was educated and fit by a healthcare professional with expert knowledge and specialization in brace application while under the direct supervision of the treating physician. Verbal and written instructions for the use of and application of this item were provided. They were instructed to contact the office immediately should the brace result in increased pain, decreased sensation, increased swelling or worsening of the condition. Activity restrictions discussed today. Follow up-he is already established patient with Dr. Ashley Pereyra. He will follow-up in 10 days if still symptomatic. Call or return to clinic if these symptoms worsen or fail to improve as anticipated. Stephy Zhang PA-C   Senior Physician Assistant   [facility]                                         Disclaimer: This note was generated with use of a verbal recognition program (DRAGON) and an attempt was made to check for errors. It is possible that there are still dictated errors within this office note. If so, please bring any significant errors to my attention for an addendum. All efforts were made to ensure that this office note is accurate.

## 2022-01-26 ENCOUNTER — OFFICE VISIT (OUTPATIENT)
Dept: ORTHOPEDIC SURGERY | Age: 72
End: 2022-01-26
Payer: MEDICARE

## 2022-01-26 VITALS — HEIGHT: 71 IN | BODY MASS INDEX: 31.5 KG/M2 | WEIGHT: 225 LBS | RESPIRATION RATE: 15 BRPM

## 2022-01-26 DIAGNOSIS — M25.531 RIGHT WRIST PAIN: Primary | ICD-10-CM

## 2022-01-26 PROCEDURE — 3017F COLORECTAL CA SCREEN DOC REV: CPT | Performed by: PHYSICIAN ASSISTANT

## 2022-01-26 PROCEDURE — 4004F PT TOBACCO SCREEN RCVD TLK: CPT | Performed by: PHYSICIAN ASSISTANT

## 2022-01-26 PROCEDURE — G8427 DOCREV CUR MEDS BY ELIG CLIN: HCPCS | Performed by: PHYSICIAN ASSISTANT

## 2022-01-26 PROCEDURE — 1123F ACP DISCUSS/DSCN MKR DOCD: CPT | Performed by: PHYSICIAN ASSISTANT

## 2022-01-26 PROCEDURE — 4040F PNEUMOC VAC/ADMIN/RCVD: CPT | Performed by: PHYSICIAN ASSISTANT

## 2022-01-26 PROCEDURE — G8484 FLU IMMUNIZE NO ADMIN: HCPCS | Performed by: PHYSICIAN ASSISTANT

## 2022-01-26 PROCEDURE — 99213 OFFICE O/P EST LOW 20 MIN: CPT | Performed by: PHYSICIAN ASSISTANT

## 2022-01-26 PROCEDURE — G8417 CALC BMI ABV UP PARAM F/U: HCPCS | Performed by: PHYSICIAN ASSISTANT

## 2022-01-26 NOTE — PROGRESS NOTES
Mr. Ramy Ayala is a 80 y.o. right handed man  who is seen today in Hand Surgical Consultation at the request of Ana Laura Leija MD. He has previously been seen by Dr. Citlaly Disla for treatment of his right thumb cmc joint osteoarthritis. He is seen today regarding an injury occurring on January 8th, 2022. He reports injuring his right Wrist, having Claudell Rgioberto on a wet surface at Home. At the time of injury, there was not clear dislocation or malposition of the wrist.  He was seen for Emergency evaluation elsewhere, radiographs were obtained & he has been immobilized. By report, there  was not an associated skin injury. He reports mild pain located in the Radial and Volar aspect of the wrist, no tenderness of the remaining fingers, hand, or elbow. He notes today, no neurologic symptoms in the Whole Hand. Symptoms are improving over time. I have today reviewed with Ramy Ayala the clinically relevant, past medical history, medications, allergies,  family history, social history, and Review Of Systems & I have documented any details relevant to today's presenting complaints in my history above. Mr. Dameon Garcias self-reported past medical history, medications, allergies,  family history, social history, and Review Of Systems have been scanned into the chart under the \"Media\" tab. Physical Exam:  Mr. Dameon Warren's most recent vitals:  Vitals  Resp: 15  Height: 5' 11\" (180.3 cm)  Weight: 225 lb (102.1 kg)    He is well nourished, oriented to person, place & time. He demonstrates appropriate mood and affect as well as normal gait and station. Skin: Normal in appearance, Normal Color and Free of Lesions Bilaterally   Digital range of motion is limited by osteoarthritis. Specifically in the right thumb secondary to osteoarthritis. Wrist range of motion is without significant limitation bilaterally  Sensation is subjectively normal in the Whole Hand.   All other digits are normally sensate bilaterally  Vascular examination reveals normal, good capillary refill and good color bilaterally. There is no acute ecchymosis on the Right, normal on the Left. Swelling is minimal in the Radial and Volar wrist.  No other digit or hand bilaterally shows sign of swelling. Muscular strength is clinically appropriate bilaterally. Maximal pain is elicited with palpation of the Radial, specifically at the 1st [facility] joint. The base of the hand & wrist are mildly tender to palpation. There is minimal pain with palpation of the anatomical snuff box and volar tubercle of the scaphoid. There is not clinical evidence of skeletal deformity or mal-rotation. Radiographic Evaluation:  Radiographs, taken In My Office were Personally Reviewed & Interpreted by myself today (3 views of the right wrist). They demonstrate no evidence of an acute fracture of the distal radius, ulna, or carpal bones (specifically the scaphoid appears normal). The [facility] joints & bases of the  Metacarpals do not show displacement or acute injury. There is moderate evidence of degenerative change. There is not evidence of other injury or bony fracture. Impression:  Mr. Lynda De La O has sustained recent aggravation of his 1st cmc joint osteoarthritis status post fall and presents requesting further treatment. Plan:  I have discussed with Mr. Lynda De La O the various treatment options for treatment of right wrist injury. We discussed the options of conservative treatment with immobilization, rest, activity limitation, and the judicious use of OTC anti-inflamatory medications. he has elected to proceed conservativly, voicing an understanding of the other options available to him. I have explained the complications, limitations, expectations, alternatives, & risks of his chosen treatment. I have explained the likely healing time and the need for protection and limited mobilization.   We discussed the possibility of residual symptoms as may be related to conservative treatment of ligament injuries including the possiblities of: persistant deformity, persistant pain, limitation of motion, persistent instability, future arthritic symptoms & the possible need for further treatment. I also explained the small possibility of further occult injury existing. I have discussed with him the possibility of obtaining further more advanced imaging study to further evaluate his wrist injury, an order for further testing has been previously provided . He understood our discussion and was comfortable with his decision; he was provided with appropriate expectations. Mr. Sid Vazquez is asked to contact me by telephone in 4 weeks if his symptoms have not resolved or improved to his satisfaction, or if he has not regained full painless or satisfactory range of motion & use of his hand(s). He is specifically instructed to contact the office between now & his scheduled appointment if he has concerns related to his condition. He is welcome to call for an appointment sooner if he has any additional concerns or questions.

## 2022-02-28 NOTE — TELEPHONE ENCOUNTER
LAST VISIT 10/20/2020 WITH DR LÓPEZ, NEXT VISIT NONE.   [facility]       1/22/2021  6:15 PM - Willie Warren Incoming Lab Results From Soft (Epic Adt)    Component Value Flag Ref Range Units Status   Cholesterol, Total 141   0 - 199 mg/dL Final   Triglycerides 73   0 - 150 mg/dL Final   HDL 52   40 - 60 mg/dL Final   LDL Calculated 74   <100 mg/dL Final   VLDL Cholesterol Calculated 15   Not Established mg/dL Final

## 2022-03-01 ENCOUNTER — TELEPHONE (OUTPATIENT)
Dept: FAMILY MEDICINE CLINIC | Age: 72
End: 2022-03-01

## 2022-03-01 DIAGNOSIS — R73.9 HYPERGLYCEMIA: ICD-10-CM

## 2022-03-01 DIAGNOSIS — R79.82 ELEVATED C-REACTIVE PROTEIN (CRP): ICD-10-CM

## 2022-03-01 DIAGNOSIS — N40.1 BENIGN PROSTATIC HYPERPLASIA WITH NOCTURIA: Primary | ICD-10-CM

## 2022-03-01 DIAGNOSIS — R35.1 BENIGN PROSTATIC HYPERPLASIA WITH NOCTURIA: Primary | ICD-10-CM

## 2022-03-01 DIAGNOSIS — E78.5 HYPERLIPIDEMIA, UNSPECIFIED HYPERLIPIDEMIA TYPE: ICD-10-CM

## 2022-03-01 RX ORDER — ATORVASTATIN CALCIUM 40 MG/1
TABLET, FILM COATED ORAL
Qty: 90 TABLET | Refills: 3 | Status: SHIPPED | OUTPATIENT
Start: 2022-03-01

## 2022-03-01 NOTE — TELEPHONE ENCOUNTER
----- Message from [employer] sent at 3/1/2022  9:57 AM EST -----  Subject: Referral Request    QUESTIONS   Reason for referral request?  [email or web address]   Has the physician seen you for this condition before? No   Preferred Specialist (if applicable)? Do you already have an appointment scheduled? No  Additional Information for Provider?   ---------------------------------------------------------------------------  --------------  CALL BACK INFO  What is the best way for the office to contact you? OK to leave message on   voicemail  Preferred Call Back Phone Number?  [phone]

## 2022-03-04 ENCOUNTER — PATIENT MESSAGE (OUTPATIENT)
Dept: FAMILY MEDICINE CLINIC | Age: 72
End: 2022-03-04

## 2022-03-04 NOTE — TELEPHONE ENCOUNTER
From: Marquise Draper  To: Dr. Genoveva Hernandez: 3/4/2022 10:23 AM EST  Subject: Blood test    Just want to make sure that when I get my blood drawn that tests for psa & a1c are included in addition to the standard metabolic panel. And I just walk into lab [facility] and give them my name and theyll be able to find my scrip correct?

## 2022-03-09 DIAGNOSIS — N40.1 BENIGN PROSTATIC HYPERPLASIA WITH NOCTURIA: ICD-10-CM

## 2022-03-09 DIAGNOSIS — R35.1 BENIGN PROSTATIC HYPERPLASIA WITH NOCTURIA: ICD-10-CM

## 2022-03-09 DIAGNOSIS — R79.82 ELEVATED C-REACTIVE PROTEIN (CRP): ICD-10-CM

## 2022-03-09 DIAGNOSIS — E78.5 HYPERLIPIDEMIA, UNSPECIFIED HYPERLIPIDEMIA TYPE: ICD-10-CM

## 2022-03-09 DIAGNOSIS — R73.9 HYPERGLYCEMIA: ICD-10-CM

## 2022-03-09 LAB
A/G RATIO: 1.8 (ref 1.1–2.2)
ALBUMIN SERPL-MCNC: 4.4 G/DL (ref 3.4–5)
ALP BLD-CCNC: 63 U/L (ref 40–129)
ALT SERPL-CCNC: 24 U/L (ref 10–40)
ANION GAP SERPL CALCULATED.3IONS-SCNC: 12 MMOL/L (ref 3–16)
AST SERPL-CCNC: 24 U/L (ref 15–37)
BILIRUB SERPL-MCNC: 0.7 MG/DL (ref 0–1)
BUN BLDV-MCNC: 12 MG/DL (ref 7–20)
C-REACTIVE PROTEIN: <3 MG/L (ref 0–5.1)
CALCIUM SERPL-MCNC: 9.6 MG/DL (ref 8.3–10.6)
CHLORIDE BLD-SCNC: 98 MMOL/L (ref 99–110)
CHOLESTEROL, TOTAL: 173 MG/DL (ref 0–199)
CO2: 26 MMOL/L (ref 21–32)
CREAT SERPL-MCNC: 1 MG/DL (ref 0.8–1.3)
GFR AFRICAN AMERICAN: >60
GFR NON-AFRICAN AMERICAN: >60
GLUCOSE BLD-MCNC: 111 MG/DL (ref 70–99)
HDLC SERPL-MCNC: 52 MG/DL (ref 40–60)
LDL CHOLESTEROL CALCULATED: 100 MG/DL
POTASSIUM SERPL-SCNC: 5 MMOL/L (ref 3.5–5.1)
PROSTATE SPECIFIC ANTIGEN: 3.75 NG/ML (ref 0–4)
SODIUM BLD-SCNC: 136 MMOL/L (ref 136–145)
TOTAL PROTEIN: 6.9 G/DL (ref 6.4–8.2)
TRIGL SERPL-MCNC: 103 MG/DL (ref 0–150)
VLDLC SERPL CALC-MCNC: 21 MG/DL

## 2022-03-10 LAB
ESTIMATED AVERAGE GLUCOSE: 134.1 MG/DL
HBA1C MFR BLD: 6.3 %

## 2022-04-28 ENCOUNTER — OFFICE VISIT (OUTPATIENT)
Dept: FAMILY MEDICINE CLINIC | Age: 72
End: 2022-04-28
Payer: MEDICARE

## 2022-04-28 VITALS
OXYGEN SATURATION: 98 % | DIASTOLIC BLOOD PRESSURE: 72 MMHG | SYSTOLIC BLOOD PRESSURE: 128 MMHG | HEIGHT: 71 IN | BODY MASS INDEX: 33.46 KG/M2 | HEART RATE: 88 BPM | WEIGHT: 239 LBS

## 2022-04-28 DIAGNOSIS — E87.5 HYPERPOTASSEMIA: ICD-10-CM

## 2022-04-28 DIAGNOSIS — K21.9 GASTROESOPHAGEAL REFLUX DISEASE, UNSPECIFIED WHETHER ESOPHAGITIS PRESENT: ICD-10-CM

## 2022-04-28 DIAGNOSIS — K22.2 STRICTURE AND STENOSIS OF ESOPHAGUS: ICD-10-CM

## 2022-04-28 DIAGNOSIS — N40.1 BENIGN PROSTATIC HYPERPLASIA WITH NOCTURIA: ICD-10-CM

## 2022-04-28 DIAGNOSIS — E78.5 HYPERLIPIDEMIA, UNSPECIFIED HYPERLIPIDEMIA TYPE: ICD-10-CM

## 2022-04-28 DIAGNOSIS — Z00.00 WELL ADULT EXAM: Primary | ICD-10-CM

## 2022-04-28 DIAGNOSIS — R73.9 HYPERGLYCEMIA: ICD-10-CM

## 2022-04-28 DIAGNOSIS — R35.1 BENIGN PROSTATIC HYPERPLASIA WITH NOCTURIA: ICD-10-CM

## 2022-04-28 DIAGNOSIS — Z85.828 HISTORY OF NONMELANOMA SKIN CANCER: ICD-10-CM

## 2022-04-28 DIAGNOSIS — R05.9 COUGH: ICD-10-CM

## 2022-04-28 PROBLEM — R06.09 DYSPNEA ON EXERTION: Status: RESOLVED | Noted: 2018-08-14 | Resolved: 2022-04-28

## 2022-04-28 PROCEDURE — 99397 PER PM REEVAL EST PAT 65+ YR: CPT | Performed by: FAMILY MEDICINE

## 2022-04-28 PROCEDURE — 3288F FALL RISK ASSESSMENT DOCD: CPT | Performed by: FAMILY MEDICINE

## 2022-04-28 SDOH — ECONOMIC STABILITY: TRANSPORTATION INSECURITY
IN THE PAST 12 MONTHS, HAS THE LACK OF TRANSPORTATION KEPT YOU FROM MEDICAL APPOINTMENTS OR FROM GETTING MEDICATIONS?: NO

## 2022-04-28 SDOH — ECONOMIC STABILITY: FOOD INSECURITY: WITHIN THE PAST 12 MONTHS, THE FOOD YOU BOUGHT JUST DIDN'T LAST AND YOU DIDN'T HAVE MONEY TO GET MORE.: NEVER TRUE

## 2022-04-28 SDOH — ECONOMIC STABILITY: FOOD INSECURITY: WITHIN THE PAST 12 MONTHS, YOU WORRIED THAT YOUR FOOD WOULD RUN OUT BEFORE YOU GOT MONEY TO BUY MORE.: NEVER TRUE

## 2022-04-28 SDOH — ECONOMIC STABILITY: TRANSPORTATION INSECURITY
IN THE PAST 12 MONTHS, HAS LACK OF TRANSPORTATION KEPT YOU FROM MEETINGS, WORK, OR FROM GETTING THINGS NEEDED FOR DAILY LIVING?: NO

## 2022-04-28 ASSESSMENT — SOCIAL DETERMINANTS OF HEALTH (SDOH): HOW HARD IS IT FOR YOU TO PAY FOR THE VERY BASICS LIKE FOOD, HOUSING, MEDICAL CARE, AND HEATING?: NOT HARD AT ALL

## 2022-04-28 ASSESSMENT — PATIENT HEALTH QUESTIONNAIRE - PHQ9
SUM OF ALL RESPONSES TO PHQ9 QUESTIONS 1 & 2: 0
2. FEELING DOWN, DEPRESSED OR HOPELESS: 0
1. LITTLE INTEREST OR PLEASURE IN DOING THINGS: 0
SUM OF ALL RESPONSES TO PHQ QUESTIONS 1-9: 0

## 2022-04-28 NOTE — PROGRESS NOTES
2022    Flavio Vasquez (:  1950) is a 71 y.o. male, here for a preventive medicine evaluation. C/o cough/ mucus in throat/ productive  Had conjunctivitis - got rx for eye but cough persisting  Treated 2 weeks ago - sxs for 3 weeks  Some worse outdoors - also finished basement - recently - ? Trigger  On asa for prevention w/ high cholesterol  Scc 2 years removed right temple - skin check w/ derm annually to every 18 months. gerd fairly well controlled - occ antacid  Some bph - occ nocturia - urgency gone - had prostate surgery  bcc 7 y ago -near left eye  Colonoscopy to schedule w/ dr. Jerry Wild soon  Diet/ activity not as good generally  ? About tdap - done  -o/w utd on immunizations  No f/c/aches - occ cold sweat - ? Allergy related cough. No exposure to close contacts who are sick  3 yo grandchild babysits 2x/ week - no exposure  Hard to clear mucus from throat. Blowing nose more/ feels pnd - occ yellow but mostly clear. Not congested. Sl winded more easily w/ exertion  Not wheezing. Using nyquil/ dayquil. Not much change off meds. Eye cleared well.   General joint issues - shoulders/ left knee/ right thumb oa  occ otc analgesics - aleve  Mood good overall  BP Readings from Last 3 Encounters:   22 128/72   19 124/72   18 130/72     Pulse Readings from Last 3 Encounters:   22 88   20 84   19 76     Wt Readings from Last 3 Encounters:   22 239 lb (108.4 kg)   22 225 lb (102.1 kg)   22 225 lb (102.1 kg)       Patient Active Problem List   Diagnosis    Benign enlargement of prostate    Lumbar strain    Stricture and stenosis of esophagus    Chest pain    Actinic keratosis    Dysphagia    Lumbago    Contusion of back    Hyperpotassemia    Pain in joint, shoulder region    Malignant neoplasm of skin    Degeneration of intervertebral disc    Diarrhea    Costochondritis    Pharyngitis    GERD (gastroesophageal reflux disease)  Primary osteoarthritis of first carpometacarpal joint of right hand    Dyspnea on exertion    Hyperlipidemia    Sprain of right wrist       Review of Systems  No cp/palp - had sternal pain from softball in chest years ago  Prior to Visit Medications    Medication Sig Taking? Authorizing Provider   atorvastatin (LIPITOR) 40 MG tablet TAKE 1 TABLET BY MOUTH EVERY DAY  Geena Kapadia MD   famotidine (PEPCID) 20 MG tablet Take 20 mg by mouth daily  Historical Provider, MD   Cholecalciferol (VITAMIN D3) 5000 UNITS CAPS Take 1 capsule by mouth daily Indications: 2000 units daily per PT   Historical Provider, MD   RESTASIS 0.05 % ophthalmic emulsion Place 1 drop into both eyes 2 times daily. Historical Provider, MD   aspirin 81 MG EC tablet Take 81 mg by mouth daily. Historical Provider, MD   multivitamin [facility]) per tablet Take 1 tablet by mouth daily.     Historical Provider, MD        No Known Allergies    Past Medical History:   Diagnosis Date    Actinic keratosis     BPH (benign prostatic hypertrophy)     BPH w/o urinary obs/LUTS     Bulging disc 1990s    lumbar    Chest pain     Contusion of back     Costochondritis 9/7/2011    Degeneration of intervertebral disc, site unspecified     Diarrhea 9/7/2011    Dysphagia, unspecified     Eosinophilic esophagitis [id number]    DR Saji Campuzano, EGD    HCV (hepatitis C virus) 12/1997    Hyperpotassemia     Lumbago     Lumbar strain     acute     Other malignant neoplasm of skin, site unspecified     Pain in joint, shoulder region     Partial Achilles tendon tear     LT     Pharyngitis 9/7/2011    Schatzki's ring [phone]    DR Saji Campuzano, EGD    Screening colonoscopy     colonoscopy (benign polyps) every 5 years 3/2007    Stricture and stenosis of esophagus        Past Surgical History:   Procedure Laterality Date    APPENDECTOMY  02/2000        COLONOSCOPY  2012    DR Irlanda MCCRARY, COLONOSCOPY, NO POLYPS, REPEAT 5 YRS    EXTERNAL EAR SURGERY      left - SCC    KNEE ARTHROSCOPY  2000    left    TURP  16    UPPER GASTROINTESTINAL ENDOSCOPY  [id number]    DR Oneal Pain, SCHATZKI'S RING AND EOSINOHILIC ESOPHAGITIS       Social History     Socioeconomic History    Marital status:      Spouse name: Not on file    Number of children: Not on file    Years of education: Not on file    Highest education level: Not on file   Occupational History    Occupation: Retired   Tobacco Use    Smoking status: Current Some Day Smoker     Packs/day: 1.00     Years: 19.00     Pack years: 19.00     Types: Cigarettes     Last attempt to quit:      Years since quittin.3    Smokeless tobacco: Never Used    Tobacco comment: smokes cigars occasionally   Vaping Use    Vaping Use: Never used   Substance and Sexual Activity    Alcohol use: Yes     Alcohol/week: 1.0 standard drink     Types: 1 Cans of beer per week     Comment: 3 to 4 times per week    Drug use: No    Sexual activity: Not on file   Other Topics Concern    Not on file   Social History Narrative    Not on file     Social Determinants of Health     Financial Resource Strain:     Difficulty of Paying Living Expenses: Not on file   Food Insecurity:     Worried About Running Out of Food in the Last Year: Not on file    [place]ito of Food in the Last Year: Not on file   Transportation Needs:     Lack of Transportation (Medical): Not on file    Lack of Transportation (Non-Medical):  Not on file   Physical Activity:     Days of Exercise per Week: Not on file    Minutes of Exercise per Session: Not on file   Stress:     Feeling of Stress : Not on file   Social Connections:     Frequency of Communication with Friends and Family: Not on file    Frequency of Social Gatherings with Friends and Family: Not on file    Attends Baptism Services: Not on file    Active Member of Clubs or Organizations: Not on file    Attends Club or Organization Meetings: Not on file    Marital Status: Not on file   Intimate Partner Violence:     Fear of Current or Ex-Partner: Not on file    Emotionally Abused: Not on file    Physically Abused: Not on file    Sexually Abused: Not on file   Housing Stability:     Unable to Pay for Housing in the Last Year: Not on file    Number of [place] in the Last Year: Not on file    Unstable Housing in the Last Year: Not on file        Family History   Problem Relation Age of Onset    Depression Mother    Roxana Stabs Other Mother         CLAVICLE FX    Cancer Father         rectal    Alcohol Abuse Father     Other Father         vasc. heart valve age 61   Roxana Stabs High Blood Pressure Brother     Other Brother 48        ALS    Heart Disease Maternal Grandmother     Heart Disease Maternal Grandfather     Cancer Maternal Grandfather         colon    Tuberculosis Paternal Grandfather     Cancer Paternal Grandfather        ADVANCE DIRECTIVE: N, <no information>    There were no vitals filed for this visit. Estimated body mass index is 31.38 kg/m² as calculated from the following:    Height as of 1/26/22: 5' 11\" (1.803 m). Weight as of 1/26/22: 225 lb (102.1 kg). Physical Exam  Constitutional:       General: He is not in acute distress. Appearance: He is well-developed. HENT:      Right Ear: Tympanic membrane normal.      Left Ear: Tympanic membrane normal.      Ears:      Comments: Mild wax left canal  Eyes:      General: No scleral icterus. Conjunctiva/sclera: Conjunctivae normal.   Cardiovascular:      Rate and Rhythm: Normal rate and regular rhythm. Pulses: Normal pulses. Heart sounds: Normal heart sounds. No murmur heard. No gallop. Pulmonary:      Effort: Pulmonary effort is normal. No respiratory distress. Breath sounds: Normal breath sounds. No wheezing, rhonchi or rales. Abdominal:      General: Bowel sounds are normal. There is no distension. Palpations: Abdomen is soft. Tenderness:  There is no abdominal tenderness. Musculoskeletal:         General: No swelling. Lymphadenopathy:      Cervical: No cervical adenopathy. Skin:     Findings: No erythema or rash. Neurological:      Mental Status: He is alert. No flowsheet data found. Lab Results   Component Value Date    CHOL 173 03/09/2022    CHOL 141 01/22/2021    CHOL 139 04/02/2019    TRIG 103 03/09/2022    TRIG 73 01/22/2021    TRIG 116 04/02/2019    HDL 52 03/09/2022    HDL 52 01/22/2021    HDL 51 04/02/2019    LDLCHOLESTEROL 157 01/14/2015    LDLCALC 100 03/09/2022    LDLCALC 74 01/22/2021    LDLCALC 65 04/02/2019    GLUCOSE 111 03/09/2022    GLUCOSE 109 04/26/2011    LABA1C 6.3 03/09/2022    LABA1C 5.8 01/22/2021    LABA1C 5.9 02/06/2020       The ASCVD Risk score (Billy Marley, et al., 2013) failed to calculate for the following reasons:     The systolic blood pressure is missing    Immunization History   Administered Date(s) Administered    COVID-19, Pfizer Purple top, DILUTE for use, 12+ yrs, 30mcg/0.3mL dose 02/09/2021, 03/02/2021, 10/06/2021    Influenza, High Dose (Fluzone 65 yrs and older) 10/19/2020    Influenza, Quadv, adjuvanted, 65 yrs +, IM, PF (Fluad) 10/19/2020    Pneumococcal Conjugate 13-valent ([id number]) 02/06/2017    Pneumococcal Polysaccharide ([id number]) 03/22/2018    Tdap (Boostrix, Adacel) 05/09/2014    Zoster Live (Zostavax) 09/17/2012    Zoster Recombinant (Shingrix) 01/19/2021       Health Maintenance   Topic Date Due    Annual Wellness Visit (AWV)  Never done    Depression Screen  10/20/2021    A1C test (Diabetic or Prediabetic)  03/09/2023    Lipids  03/09/2023    Colorectal Cancer Screen  12/16/2023    DTaP/Tdap/Td vaccine (2 - Td or Tdap) 05/09/2024    Flu vaccine  Completed    Shingles Vaccine  Completed    Pneumococcal 65+ years Vaccine  Completed    COVID-19 Vaccine  Completed    AAA screen  Completed    Hepatitis C screen  Completed    Hepatitis A vaccine  Aged Out    Hepatitis B vaccine Aged Out    Hib vaccine  Aged Out    Meningococcal (ACWY) vaccine  Aged Out       Assessment & Plan        Diagnosis Orders   1. Well adult exam     2. Benign prostatic hyperplasia with nocturia     3. Stricture and stenosis of esophagus     4. Hyperpotassemia     5. Gastroesophageal reflux disease, unspecified whether esophagitis present     6. Hyperlipidemia, unspecified hyperlipidemia type     7. Hyperglycemia     8. History of nonmelanoma skin cancer     9.  Cough     diet/ activity dw/ pt  Risks of asa and consider stop 2/2 risk > benefit for primary prevention - pt may reduce dose but will consider  Immunizations utd - covid booster dw/ pt  tx for oa d/w pt - voltaren gel/ limited oral nsaids   Weight loss/ exercise dw/ pt  Antacids prn  High sugar d/w pt  cpm w/ lipitor  Overall stable  Labs reviewed w/ pt  Suspect allergic trigger to cough - allegra w/ mucinex prn  F/u if fails to improve      --Niko Hardwick MD

## 2022-07-18 ENCOUNTER — OFFICE VISIT (OUTPATIENT)
Dept: DERMATOLOGY | Age: 72
End: 2022-07-18
Payer: MEDICARE

## 2022-07-18 DIAGNOSIS — L82.1 SEBORRHEIC KERATOSIS: ICD-10-CM

## 2022-07-18 DIAGNOSIS — L57.0 AK (ACTINIC KERATOSIS): ICD-10-CM

## 2022-07-18 DIAGNOSIS — D22.9 MULTIPLE NEVI: ICD-10-CM

## 2022-07-18 DIAGNOSIS — Z85.828 HISTORY OF NONMELANOMA SKIN CANCER: Primary | ICD-10-CM

## 2022-07-18 DIAGNOSIS — Z86.018 HISTORY OF DYSPLASTIC NEVUS: ICD-10-CM

## 2022-07-18 PROCEDURE — 99213 OFFICE O/P EST LOW 20 MIN: CPT | Performed by: DERMATOLOGY

## 2022-07-18 PROCEDURE — G8427 DOCREV CUR MEDS BY ELIG CLIN: HCPCS | Performed by: DERMATOLOGY

## 2022-07-18 PROCEDURE — 4004F PT TOBACCO SCREEN RCVD TLK: CPT | Performed by: DERMATOLOGY

## 2022-07-18 PROCEDURE — 3017F COLORECTAL CA SCREEN DOC REV: CPT | Performed by: DERMATOLOGY

## 2022-07-18 PROCEDURE — 1123F ACP DISCUSS/DSCN MKR DOCD: CPT | Performed by: DERMATOLOGY

## 2022-07-18 PROCEDURE — G8417 CALC BMI ABV UP PARAM F/U: HCPCS | Performed by: DERMATOLOGY

## 2022-07-18 PROCEDURE — 17004 DESTROY PREMAL LESIONS 15/>: CPT | Performed by: DERMATOLOGY

## 2022-07-18 NOTE — PATIENT INSTRUCTIONS
Cryosurgery (Freezing) Wound Care Instructions    AFTER THE PROCEDURE:   You will notice swelling and redness around the site. This is normal.   You may experience a sharp or sore feeling for the next several days. For this discomfort, you may take acetaminophen (Tylenol©). A blister may develop at the treated area, sometimes as soon as by the end of the day. After several days, the blister will subside and a scab will form. If the area is bumped or traumatized during the first few days following freezing, you may develop bleeding into the blister, forming a blood blister. This is nothing to be alarmed about. If the blister is tense, uncomfortable, or much larger than the site that was frozen, you may pop the blister along its edge with a sterile needle (boiled, heated under a flame, or cleaned with alcohol) to allow the fluid to drain out. If the blister does not bother you, no treatment is needed. Do NOT peel off the top of the blister roof. It will act as a dressing on top of your wound. WOUND CARE:   You may shower or bathe as usual, but avoid scrubbing the areas that have been frozen. Cleanse the site twice a day with mild soapy water, and then apply a thin film of white petrolatum (Vaseline©). You do not need to cover the area, but can if you prefer. Do NOT allow the site to become dry or crusted, or attempt to dry it out with rubbing alcohol or hydrogen peroxide. Continue this regimen until the area is pink and healed. Depending on the size and location of your cryosurgery site, healing may take 2 to 4 weeks. The area may continue to be pink for several weeks, and over the next few months may become darker or lighter than the surrounding skin. This may be a permanent change. Protecting Yourself From the Sun    Apply an over-the-counter broad spectrum water resistant sunscreen with an SPF of at least 30 to exposed areas of the skin. Dont forget the ears and lips!  Remember to reapply sunscreen about every 2 hours and after swimming or sweating. Wear sun protective clothing. Swim shirts (aka. rash guards) are a great idea and negates the need to reapply sunscreen in those areas.      Seek the shade whenever possible especially between the hours of 10 am and 4 pm when the suns rays are the strongest.     Avoid tanning beds

## 2022-07-18 NOTE — PROGRESS NOTES
[facility]  Poncho Flowers MD  [phone]      Kenneth Russell  1950    71 y.o. male     Date of Visit: 7/18/2022    Chief Complaint: f/u skin cancer and AK's  Chief Complaint   Patient presents with    Skin Exam     Last seen: 2021    History of Present Illness:    He lives on [facility] and [address] [place] [address]. 1. Here for a full skin check for hx of NMSC. No probs with previous sites. New concerns noted below. 2. He has scattered asx brown lesions on the trunk and extremities, present for many years; no change in size/shape/color of any lesions; no bleeding lesions. 3. Hx of dysplastic nevus - lower back (mod dysplastic, removed with bx 4-2021). 4. He has scattered rough lesions on the face, helices. + hx of NMSC - BCC and SCC (R thigh and face), last was ~ 2013  No family hx of melanoma. He wears sunscreen - plays golf. Always wears a hat. Often doesn't reapply. Review of Systems:  Gen: Feels well, good sense of health. Skin: No changing moles or lesions. Past Medical History, Family History, Surgical History, Medications and Allergies reviewed.     Past Medical History:   Diagnosis Date    Actinic keratosis     BPH (benign prostatic hypertrophy)     BPH w/o urinary obs/LUTS     Bulging disc 1990s    lumbar    Chest pain     Contusion of back     Costochondritis 9/7/2011    Degeneration of intervertebral disc, site unspecified     Diarrhea 9/7/2011    Dysphagia, unspecified     Eosinophilic esophagitis [id number]    DR Patti Rojas, EGD    HCV (hepatitis C virus) 12/1997    Hyperpotassemia     Lumbago     Lumbar strain     acute     Other malignant neoplasm of skin, site unspecified     Pain in joint, shoulder region     Partial Achilles tendon tear     LT     Pharyngitis 9/7/2011    Schatzki's ring [phone]    DR Patti Rojas, EGD    Screening colonoscopy     colonoscopy (benign polyps) every 5 years 3/2007    Stricture and stenosis of esophagus        Past Surgical History:   Procedure Laterality Date    APPENDECTOMY  02/2000        COLONOSCOPY  2012    DR Mick Coker, COLONOSCOPY, NO POLYPS, REPEAT 5 YRS    EXTERNAL EAR SURGERY  1/13    left - SCC    KNEE ARTHROSCOPY  1/2000    left    TURP  1/25/16    UPPER GASTROINTESTINAL ENDOSCOPY  [id number]    DR Mick Coker, SCHATZKI'S RING AND EOSINOHILIC ESOPHAGITIS       Outpatient Medications Marked as Taking for the 7/18/22 encounter (Office Visit) with Jonathan Zabala MD   Medication Sig Dispense Refill    atorvastatin (LIPITOR) 40 MG tablet TAKE 1 TABLET BY MOUTH EVERY DAY 90 tablet 3    famotidine (PEPCID) 20 MG tablet Take 20 mg by mouth daily      Cholecalciferol (VITAMIN D3) 5000 UNITS CAPS Take 1 capsule by mouth daily Indications: 2000 units daily per PT       RESTASIS 0.05 % ophthalmic emulsion Place 1 drop into both eyes 2 times daily. 1    aspirin 81 MG EC tablet Take 81 mg by mouth daily. multivitamin (THERAGRAN) per tablet Take 1 tablet by mouth daily. No Known Allergies      Physical Examination     Gen, well-appearing  FSE performed except for underwear-covered areas    Scars clear  trunk and extremities with scattered brown macules and papules   helices, and face + arms - erythematous roughly scaled macules  Central lower back with scar - clear    Assessment and Plan     1. Hx of NMSC, no signs recurrence  2. Few benign-appearing nevi and SK's  3. Hx of dysplastic nevus, no signs recurrence  *Central lower back - moderately dysplastic nevus - removed with bx 4-2021  - Monitor for ABCD's of MM and si/sx of NMSC  Continue sun protection - OTC sunscreen with SPF 30-50+ recommended and reviewed usage  Encouraged skin check yearly (sooner if indicated), self checks    4. AK's - face and helices  - cont sun protection  - 15 total lesion(s) treated with liquid nitrogen x 2 cycles. Patient educated on risk of blister, hypopigmentation/scar and wound care.    - consider efudex + vit D this coming winter for the remaining lesions on the FH and temples; ed irritation, erythema and photosensitivity and expectations    F/u 1 year.

## 2022-09-14 ENCOUNTER — OFFICE VISIT (OUTPATIENT)
Dept: ORTHOPEDIC SURGERY | Age: 72
End: 2022-09-14
Payer: MEDICARE

## 2022-09-14 ENCOUNTER — TELEPHONE (OUTPATIENT)
Dept: ORTHOPEDIC SURGERY | Age: 72
End: 2022-09-14

## 2022-09-14 VITALS — WEIGHT: 235 LBS | HEIGHT: 71 IN | BODY MASS INDEX: 32.9 KG/M2

## 2022-09-14 DIAGNOSIS — M76.71 PERONEAL TENDINITIS OF RIGHT LOWER EXTREMITY: Primary | ICD-10-CM

## 2022-09-14 DIAGNOSIS — M79.671 RIGHT FOOT PAIN: ICD-10-CM

## 2022-09-14 PROCEDURE — 99203 OFFICE O/P NEW LOW 30 MIN: CPT | Performed by: ORTHOPAEDIC SURGERY

## 2022-09-14 PROCEDURE — G8427 DOCREV CUR MEDS BY ELIG CLIN: HCPCS | Performed by: ORTHOPAEDIC SURGERY

## 2022-09-14 PROCEDURE — G8417 CALC BMI ABV UP PARAM F/U: HCPCS | Performed by: ORTHOPAEDIC SURGERY

## 2022-09-14 PROCEDURE — 3017F COLORECTAL CA SCREEN DOC REV: CPT | Performed by: ORTHOPAEDIC SURGERY

## 2022-09-14 PROCEDURE — 4004F PT TOBACCO SCREEN RCVD TLK: CPT | Performed by: ORTHOPAEDIC SURGERY

## 2022-09-14 PROCEDURE — 1123F ACP DISCUSS/DSCN MKR DOCD: CPT | Performed by: ORTHOPAEDIC SURGERY

## 2022-09-14 RX ORDER — NAPROXEN 500 MG/1
500 TABLET ORAL 2 TIMES DAILY WITH MEALS
Qty: 60 TABLET | Refills: 0 | Status: SHIPPED | OUTPATIENT
Start: 2022-09-14 | End: 2022-10-14

## 2022-09-17 PROBLEM — M76.71 PERONEAL TENDINITIS OF RIGHT LOWER EXTREMITY: Status: ACTIVE | Noted: 2022-09-17

## 2022-09-17 NOTE — PROGRESS NOTES
CHIEF COMPLAINT: Right  lateral midfoot pain/peroneal insertional tendenosis. HISTORY:  Mr. Akiko Ybarra 71 y.o.  male presents today for the first visit for evaluation of right lateral midfoot pain which started spring 2022. He is complaining of sharp pain, 3/10. Pain is increase with standing and walking, and decrease with rest. Pain is sharp with first few steps, dull achy pain by the end of the day. The pain radiates to lateral leg, and no numbness and tingling sensation. No other complaint.       Past Medical History:   Diagnosis Date    Actinic keratosis     BPH (benign prostatic hypertrophy)     BPH w/o urinary obs/LUTS     Bulging disc 1990s    lumbar    Chest pain     Contusion of back     Costochondritis 9/7/2011    Degeneration of intervertebral disc, site unspecified     Diarrhea 9/7/2011    Dysphagia, unspecified     Eosinophilic esophagitis [id number]    DR Mick Coker, EGD    HCV (hepatitis C virus) 12/1997    Hyperpotassemia     Lumbago     Lumbar strain     acute     Other malignant neoplasm of skin, site unspecified     Pain in joint, shoulder region     Partial Achilles tendon tear     LT     Pharyngitis 9/7/2011    Schatzki's ring [phone]    DR Mick Coker, EGD    Screening colonoscopy     colonoscopy (benign polyps) every 5 years 3/2007    Stricture and stenosis of esophagus        Past Surgical History:   Procedure Laterality Date    APPENDECTOMY  02/2000        COLONOSCOPY  2012    DR John MCCRARY, COLONOSCOPY, NO POLYPS, REPEAT 5 YRS    EXTERNAL EAR SURGERY  1/13    left - SCC    KNEE ARTHROSCOPY  1/2000    left    TURP  1/25/16    UPPER GASTROINTESTINAL ENDOSCOPY  [id number]    DR Mick Coker, SCHATZKI'S RING AND EOSINOHILIC ESOPHAGITIS       Social History     Socioeconomic History    Marital status:      Spouse name: Not on file    Number of children: Not on file    Years of education: Not on file    Highest education level: Not on file   Occupational History    Occupation: Retired   Tobacco Use    Smoking status: Some Days     Packs/day: 1.00     Years: 19.00     Pack years: 19.00     Types: Cigarettes     Last attempt to quit:      Years since quittin.7    Smokeless tobacco: Never    Tobacco comments:     smokes cigars occasionally   Vaping Use    Vaping Use: Never used   Substance and Sexual Activity    Alcohol use: Yes     Alcohol/week: 1.0 standard drink     Types: 1 Cans of beer per week     Comment: 3 to 4 times per week    Drug use: No    Sexual activity: Not on file   Other Topics Concern    Not on file   Social History Narrative    Not on file     Social Determinants of Health     Financial Resource Strain: Low Risk     Difficulty of Paying Living Expenses: Not hard at all   Food Insecurity: No Food Insecurity    Worried About Running Out of Food in the Last Year: Never true    Ran Out of Food in the Last Year: Never true   Transportation Needs: No Transportation Needs    Lack of Transportation (Medical): No    Lack of Transportation (Non-Medical):  No   Physical Activity: Not on file   Stress: Not on file   Social Connections: Not on file   Intimate Partner Violence: Not on file   Housing Stability: Not on file       Family History   Problem Relation Age of Onset    Depression Mother     Other Mother         CLAVICLE FX    Cancer Father         rectal    Alcohol Abuse Father     Other Father         vasc. heart valve age 61    High Blood Pressure Brother     Other Brother 48        ALS    Heart Disease Maternal Grandmother     Heart Disease Maternal Grandfather     Cancer Maternal Grandfather         colon    Tuberculosis Paternal Grandfather     Cancer Paternal Grandfather        Current Outpatient Medications on File Prior to Visit   Medication Sig Dispense Refill    atorvastatin (LIPITOR) 40 MG tablet TAKE 1 TABLET BY MOUTH EVERY DAY 90 tablet 3    famotidine (PEPCID) 20 MG tablet Take 20 mg by mouth daily      Cholecalciferol (VITAMIN D3) 5000 UNITS CAPS Take 1 capsule by mouth daily Indications: 2000 units daily per PT       RESTASIS 0.05 % ophthalmic emulsion Place 1 drop into both eyes 2 times daily. 1    aspirin 81 MG EC tablet Take 81 mg by mouth daily. multivitamin (THERAGRAN) per tablet Take 1 tablet by mouth daily. No current facility-administered medications on file prior to visit. Pertinent items are noted in HPI  Review of systems reviewed from Patient History Form and available in the patient's chart under the Media tab. No change noted. PHYSICAL EXAMINATION:  Mr. Suzie Perry is a very pleasant 71 y.o.  male who presents today in no acute distress, awake, alert, and oriented. He is well dressed, nourished and  groomed. Patient with normal affect. Height is  5' 11\" (1.803 m), weight is 235 lb (106.6 kg), Body mass index is 32.78 kg/m². Resting respiratory rate is 16. Examination of the gait, showed that the patient walks heel-toe with a minimal limp. Examination of both ankles showing dorsiflexion to about 10 degrees bilaterally, which increased with knee flexion. He has intact sensation and good pedal pulses. He has weak eversion, and has tenderness on deep palpation over the peroneal tendon insertion over 5th MT base with swelling compared to the other side. The ankles are stable to drawer test bilaterally, equally. IMAGING: Nahed Perez were reviewed, 3 views of the right foot taken in office today, and showed no acute fracture. IMPRESSION: Right peroneal insertional tendenosis. PLAN: I discussed with the patient all the treatment options, and natural history of peroneal tendenitis. We recommended stretching exercises of the calf which was taught to the patient today. He will take NSAIDS Naprosyn. We discussed the risk of progression. We will see him  back in 6 weeks and may consider PT if needed.       Ashish Conley MD

## 2022-10-26 ENCOUNTER — OFFICE VISIT (OUTPATIENT)
Dept: ORTHOPEDIC SURGERY | Age: 72
End: 2022-10-26
Payer: MEDICARE

## 2022-10-26 VITALS — WEIGHT: 235 LBS | HEIGHT: 71 IN | BODY MASS INDEX: 32.9 KG/M2

## 2022-10-26 DIAGNOSIS — M76.71 PERONEAL TENDINITIS OF RIGHT LOWER EXTREMITY: Primary | ICD-10-CM

## 2022-10-26 PROCEDURE — 3017F COLORECTAL CA SCREEN DOC REV: CPT | Performed by: ORTHOPAEDIC SURGERY

## 2022-10-26 PROCEDURE — 99213 OFFICE O/P EST LOW 20 MIN: CPT | Performed by: NURSE PRACTITIONER

## 2022-10-26 PROCEDURE — 4004F PT TOBACCO SCREEN RCVD TLK: CPT | Performed by: ORTHOPAEDIC SURGERY

## 2022-10-26 PROCEDURE — 1123F ACP DISCUSS/DSCN MKR DOCD: CPT | Performed by: ORTHOPAEDIC SURGERY

## 2022-10-26 PROCEDURE — G8427 DOCREV CUR MEDS BY ELIG CLIN: HCPCS | Performed by: ORTHOPAEDIC SURGERY

## 2022-10-26 PROCEDURE — G8417 CALC BMI ABV UP PARAM F/U: HCPCS | Performed by: ORTHOPAEDIC SURGERY

## 2022-10-26 PROCEDURE — G8484 FLU IMMUNIZE NO ADMIN: HCPCS | Performed by: ORTHOPAEDIC SURGERY

## 2022-10-26 RX ORDER — NAPROXEN 500 MG/1
500 TABLET ORAL 2 TIMES DAILY WITH MEALS
Qty: 60 TABLET | Refills: 0 | Status: SHIPPED | OUTPATIENT
Start: 2022-10-26 | End: 2022-11-25

## 2022-10-26 NOTE — PROGRESS NOTES
CHIEF COMPLAINT: Right  lateral midfoot pain/peroneal insertional tendenosis. HISTORY:  Lamonte Holes 71 y. o.  male presents today for follow up visit for evaluation of right lateral midfoot pain which started spring 2022. He has been taking NSAIDS and working on ROM and strengthening on his own with good improvement. He rates his pain a 2/10 with very intermittent soreness. Most time has no pain at all. Only has pain across bottom of foot when he does increase exercise. The pain radiates to lateral leg, and no numbness and tingling sensation. No other complaint.       Past Medical History:   Diagnosis Date    Actinic keratosis     BPH (benign prostatic hypertrophy)     BPH w/o urinary obs/LUTS     Bulging disc 1990s    lumbar    Chest pain     Contusion of back     Costochondritis 9/7/2011    Degeneration of intervertebral disc, site unspecified     Diarrhea 9/7/2011    Dysphagia, unspecified     Eosinophilic esophagitis [id number]    DR Suly Hernandez, EGD    HCV (hepatitis C virus) 12/1997    Hyperpotassemia     Lumbago     Lumbar strain     acute     Other malignant neoplasm of skin, site unspecified     Pain in joint, shoulder region     Partial Achilles tendon tear     LT     Pharyngitis 9/7/2011    Schatzki's ring [phone]    DR Suly Hernandez, EGD    Screening colonoscopy     colonoscopy (benign polyps) every 5 years 3/2007    Stricture and stenosis of esophagus      Past Surgical History:   Procedure Laterality Date    APPENDECTOMY  02/2000        COLONOSCOPY  2012    DR Damian MCCRARY, COLONOSCOPY, NO POLYPS, REPEAT 5 YRS    EXTERNAL EAR SURGERY  1/13    left - SCC    KNEE ARTHROSCOPY  1/2000    left    TURP  1/25/16    UPPER GASTROINTESTINAL ENDOSCOPY  [id number]    DR Suly Hernandez, SCHATZKI'S RING AND EOSINOHILIC ESOPHAGITIS     Family History   Problem Relation Age of Onset    Depression Mother     Other Mother         CLAVICLE FX    Cancer Father         rectal    Alcohol Abuse Father     Other Father         vasc. heart valve age 61    High Blood Pressure Brother     Other Brother 48        ALS    Heart Disease Maternal Grandmother     Heart Disease Maternal Grandfather     Cancer Maternal Grandfather         colon    Tuberculosis Paternal Grandfather     Cancer Paternal Grandfather      Social History     Socioeconomic History    Marital status:      Spouse name: Not on file    Number of children: Not on file    Years of education: Not on file    Highest education level: Not on file   Occupational History    Occupation: Retired   Tobacco Use    Smoking status: Some Days     Packs/day: 1.00     Years: 19.00     Pack years: 19.00     Types: Cigarettes     Last attempt to quit:      Years since quittin.8    Smokeless tobacco: Never    Tobacco comments:     smokes cigars occasionally   Vaping Use    Vaping Use: Never used   Substance and Sexual Activity    Alcohol use: Yes     Alcohol/week: 1.0 standard drink     Types: 1 Cans of beer per week     Comment: 3 to 4 times per week    Drug use: No    Sexual activity: Not on file   Other Topics Concern    Not on file   Social History Narrative    Not on file     Social Determinants of Health     Financial Resource Strain: Low Risk     Difficulty of Paying Living Expenses: Not hard at all   Food Insecurity: No Food Insecurity    Worried About Running Out of Food in the Last Year: Never true    Ran Out of Food in the Last Year: Never true   Transportation Needs: No Transportation Needs    Lack of Transportation (Medical): No    Lack of Transportation (Non-Medical):  No   Physical Activity: Not on file   Stress: Not on file   Social Connections: Not on file   Intimate Partner Violence: Not on file   Housing Stability: Not on file     Current Outpatient Medications   Medication Sig Dispense Refill    naproxen (NAPROSYN) 500 MG tablet Take 1 tablet by mouth 2 times daily (with meals) 60 tablet 0    naproxen (NAPROSYN) 500 MG tablet Take 1 tablet by mouth 2 times daily (with meals) 60 tablet 0    atorvastatin (LIPITOR) 40 MG tablet TAKE 1 TABLET BY MOUTH EVERY DAY 90 tablet 3    famotidine (PEPCID) 20 MG tablet Take 20 mg by mouth daily      Cholecalciferol (VITAMIN D3) 5000 UNITS CAPS Take 1 capsule by mouth daily Indications: 2000 units daily per PT       RESTASIS 0.05 % ophthalmic emulsion Place 1 drop into both eyes 2 times daily. 1    aspirin 81 MG EC tablet Take 81 mg by mouth daily. multivitamin (THERAGRAN) per tablet Take 1 tablet by mouth daily. No current facility-administered medications for this visit. Pertinent items are noted in HPI  Review of systems reviewed from Patient History Form and available in the patient's chart under the Media tab. No change noted. PHYSICAL EXAMINATION:  Mr. Francisca Ramos is a very pleasant 71 y.o.  male who presents today in no acute distress, awake, alert, and oriented. He is well dressed, nourished and  groomed. Patient with normal affect. Height is  5' 11\" (1.803 m), weight is 235 lb (106.6 kg), Body mass index is 32.78 kg/m². Resting respiratory rate is 16. Examination of the gait, showed that the patient walks heel-toe with no limp. Examination of both ankles showing dorsiflexion to about 10 degrees bilaterally, which increased with knee flexion. He has intact sensation and good pedal pulses. He has strong eversion, and has no tenderness on deep palpation over the peroneal tendon insertion over 5th MT base with swelling compared to the other side. The ankles are stable to drawer test bilaterally, equally. IMAGING: Rand Angles were reviewed, 3 views of the right foot taken in office today, and showed no acute fracture. IMPRESSION: Right insertional peroneal insertional tendenosis. PLAN: I discussed with the patient all the treatment options, and natural history of peroneal tendenitis.   We recommended continuing the stretching exercises of the calf which was taught to the patient today. He will take NSAIDS Naprosyn and change to as needed. We discussed the risk of progression. We will see him  back in 2 months and may consider PT with mild ptosis pheresis if needed.     Jerardo Aguilar, APRN - CNP

## 2022-12-01 ENCOUNTER — TELEPHONE (OUTPATIENT)
Dept: FAMILY MEDICINE CLINIC | Age: 72
End: 2022-12-01

## 2022-12-01 NOTE — TELEPHONE ENCOUNTER
Patient tested positive last night for Covid. Symptoms - running nose, blow- no color, tightness in chest, cough-no color, little body ache, not sleeping well, no headache, sore throat from coughing, little drainage - started 2 days ago    [facility] - [address] [place] [address] Rd - PHONE NO. [phone]    PLEASE GIVE HIM A CALL BACK.

## 2023-02-02 ENCOUNTER — TELEPHONE (OUTPATIENT)
Dept: FAMILY MEDICINE CLINIC | Age: 73
End: 2023-02-02

## 2023-02-02 DIAGNOSIS — R73.9 HYPERGLYCEMIA: Primary | ICD-10-CM

## 2023-02-02 DIAGNOSIS — E78.5 HYPERLIPIDEMIA, UNSPECIFIED HYPERLIPIDEMIA TYPE: ICD-10-CM

## 2023-02-02 DIAGNOSIS — Z12.5 SCREENING PSA (PROSTATE SPECIFIC ANTIGEN): ICD-10-CM

## 2023-02-02 DIAGNOSIS — N40.1 BENIGN PROSTATIC HYPERPLASIA WITH NOCTURIA: ICD-10-CM

## 2023-02-02 DIAGNOSIS — E87.5 HYPERPOTASSEMIA: ICD-10-CM

## 2023-02-02 DIAGNOSIS — R35.1 BENIGN PROSTATIC HYPERPLASIA WITH NOCTURIA: ICD-10-CM

## 2023-02-02 NOTE — TELEPHONE ENCOUNTER
Patient called and said he will go to [facility] out patient lab to get these done    He will get the a1c soon as dr. Phyllis Antonio mentioned to him a  Then of 4-24-23 he will get the fasting labs

## 2023-02-02 NOTE — TELEPHONE ENCOUNTER
SPOKE TO PT AND CHANGED ORDERS TO LAB COLLECT. HE CAN GO IN EARLY MARCH FOR ALL LABS TO BE PERFORMED.   [address] [facility]

## 2023-02-02 NOTE — TELEPHONE ENCOUNTER
ELANA FOR PT TO CALL BACK TO SCHEDULE ANNUAL [employer] WELLNESS VISIT AND FASTING LABS A WEEK AHEAD OF HIS APPT. I WILL AWAIT A CALL BACK. PT DUE FOR CMP, LIPID, HGBA1C AND PSA LEVEL.   [address] [facility]

## 2023-02-02 NOTE — TELEPHONE ENCOUNTER
[address] Angelica Matos AND PT SCHEDULED HIS INSURANCE APPROVED PHYSICAL FOR 5/2023 AND NEEDS HIS BLOOD WORK DONE IN LATE April 2023. SHE WILL SCHEDULE HIS LAB APPT. OK PER [facility].   [address] [place] [address]

## 2023-02-20 ENCOUNTER — HOSPITAL ENCOUNTER (EMERGENCY)
Age: 73
Discharge: HOME | End: 2023-02-20
Attending: EMERGENCY MEDICINE
Payer: MEDICARE

## 2023-02-20 ENCOUNTER — TELEPHONE (OUTPATIENT)
Dept: FAMILY MEDICINE CLINIC | Age: 73
End: 2023-02-20

## 2023-02-20 ENCOUNTER — APPOINTMENT (OUTPATIENT)
Dept: CT IMAGING | Age: 73
End: 2023-02-20
Payer: MEDICARE

## 2023-02-20 VITALS
DIASTOLIC BLOOD PRESSURE: 85 MMHG | RESPIRATION RATE: 11 BRPM | TEMPERATURE: 98.6 F | WEIGHT: 230 LBS | OXYGEN SATURATION: 99 % | HEART RATE: 57 BPM | SYSTOLIC BLOOD PRESSURE: 151 MMHG | BODY MASS INDEX: 32.08 KG/M2

## 2023-02-20 DIAGNOSIS — N30.91 HEMORRHAGIC CYSTITIS: Primary | ICD-10-CM

## 2023-02-20 LAB
A/G RATIO: 1.1 (ref 1.1–2.2)
ALBUMIN SERPL-MCNC: 4 G/DL (ref 3.4–5)
ALP BLD-CCNC: 64 U/L (ref 40–129)
ALT SERPL-CCNC: 18 U/L (ref 10–40)
ANION GAP SERPL CALCULATED.3IONS-SCNC: 9 MMOL/L (ref 3–16)
APTT: 26.2 SEC (ref 23–34.3)
AST SERPL-CCNC: 21 U/L (ref 15–37)
BACTERIA: ABNORMAL /HPF
BASOPHILS ABSOLUTE: 0.1 K/UL (ref 0–0.2)
BASOPHILS RELATIVE PERCENT: 0.7 %
BILIRUB SERPL-MCNC: 0.3 MG/DL (ref 0–1)
BILIRUBIN URINE: NEGATIVE
BLOOD, URINE: ABNORMAL
BUN BLDV-MCNC: 13 MG/DL (ref 7–20)
CALCIUM SERPL-MCNC: 9.4 MG/DL (ref 8.3–10.6)
CHLORIDE BLD-SCNC: 103 MMOL/L (ref 99–110)
CLARITY: ABNORMAL
CO2: 25 MMOL/L (ref 21–32)
COLOR: ABNORMAL
COMMENT UA: ABNORMAL
CREAT SERPL-MCNC: 1 MG/DL (ref 0.8–1.3)
EOSINOPHILS ABSOLUTE: 0.2 K/UL (ref 0–0.6)
EOSINOPHILS RELATIVE PERCENT: 2.5 %
GFR SERPL CREATININE-BSD FRML MDRD: >60 ML/MIN/{1.73_M2}
GLUCOSE BLD-MCNC: 130 MG/DL (ref 70–99)
GLUCOSE URINE: NEGATIVE MG/DL
HCT VFR BLD CALC: 43.8 % (ref 40.5–52.5)
HEMOGLOBIN: 14.6 G/DL (ref 13.5–17.5)
INR BLD: 1.07 (ref 0.87–1.14)
KETONES, URINE: NEGATIVE MG/DL
LEUKOCYTE ESTERASE, URINE: NEGATIVE
LYMPHOCYTES ABSOLUTE: 1.9 K/UL (ref 1–5.1)
LYMPHOCYTES RELATIVE PERCENT: 21.5 %
MCH RBC QN AUTO: 31.7 PG (ref 26–34)
MCHC RBC AUTO-ENTMCNC: 33.5 G/DL (ref 31–36)
MCV RBC AUTO: 94.7 FL (ref 80–100)
MICROSCOPIC EXAMINATION: YES
MONOCYTES ABSOLUTE: 1 K/UL (ref 0–1.3)
MONOCYTES RELATIVE PERCENT: 11 %
NEUTROPHILS ABSOLUTE: 5.6 K/UL (ref 1.7–7.7)
NEUTROPHILS RELATIVE PERCENT: 64.3 %
NITRITE, URINE: NEGATIVE
PDW BLD-RTO: 12.6 % (ref 12.4–15.4)
PH UA: 6.5 (ref 5–8)
PLATELET # BLD: 247 K/UL (ref 135–450)
PMV BLD AUTO: 8.4 FL (ref 5–10.5)
POTASSIUM REFLEX MAGNESIUM: 5.1 MMOL/L (ref 3.5–5.1)
PROTEIN UA: 100 MG/DL
PROTHROMBIN TIME: 13.8 SEC (ref 11.7–14.5)
RBC # BLD: 4.62 M/UL (ref 4.2–5.9)
RBC UA: >100 /HPF (ref 0–4)
SODIUM BLD-SCNC: 137 MMOL/L (ref 136–145)
SPECIFIC GRAVITY UA: 1.01 (ref 1–1.03)
TOTAL PROTEIN: 7.5 G/DL (ref 6.4–8.2)
URINE REFLEX TO CULTURE: ABNORMAL
URINE TYPE: ABNORMAL
UROBILINOGEN, URINE: 0.2 E.U./DL
WBC # BLD: 8.7 K/UL (ref 4–11)
WBC UA: ABNORMAL /HPF (ref 0–5)

## 2023-02-20 PROCEDURE — 36415 COLL VENOUS BLD VENIPUNCTURE: CPT

## 2023-02-20 PROCEDURE — 81001 URINALYSIS AUTO W/SCOPE: CPT

## 2023-02-20 PROCEDURE — 51798 US URINE CAPACITY MEASURE: CPT

## 2023-02-20 PROCEDURE — 99284 EMERGENCY DEPT VISIT MOD MDM: CPT

## 2023-02-20 PROCEDURE — 85610 PROTHROMBIN TIME: CPT

## 2023-02-20 PROCEDURE — 74176 CT ABD & PELVIS W/O CONTRAST: CPT

## 2023-02-20 PROCEDURE — 85730 THROMBOPLASTIN TIME PARTIAL: CPT

## 2023-02-20 PROCEDURE — 85025 COMPLETE CBC W/AUTO DIFF WBC: CPT

## 2023-02-20 PROCEDURE — 80053 COMPREHEN METABOLIC PANEL: CPT

## 2023-02-20 RX ORDER — CEPHALEXIN 500 MG/1
500 CAPSULE ORAL 3 TIMES DAILY
Qty: 21 CAPSULE | Refills: 0 | Status: SHIPPED | OUTPATIENT
Start: 2023-02-20 | End: 2023-02-27

## 2023-02-20 ASSESSMENT — PAIN - FUNCTIONAL ASSESSMENT: PAIN_FUNCTIONAL_ASSESSMENT: 0-10

## 2023-02-20 ASSESSMENT — PAIN SCALES - GENERAL: PAINLEVEL_OUTOF10: 2

## 2023-02-20 ASSESSMENT — PAIN DESCRIPTION - ORIENTATION: ORIENTATION: LOWER

## 2023-02-20 ASSESSMENT — PAIN DESCRIPTION - LOCATION: LOCATION: ABDOMEN

## 2023-02-20 NOTE — ED PROVIDER NOTES
EMERGENCY MEDICINE PROVIDER NOTE    Patient Identification  Pt Name: Qi Fisher  MRN: [id number]  [place] 1950  Date of evaluation: 2/20/2023  Provider: Carmen Santos DO  PCP: Janusz Daniel MD    Chief Complaint  Hematuria (Since Sunday morning, states passing clots. Dark red blood.)      HPI  (History provided by patient)  This is a 71 y.o. male who was brought in by self for hematuria. He reports symptoms started yesterday about 3 AM.  He awoke and had gross hematuria with clots. He has been having similar symptoms all day yesterday and somewhat today. He feels a fullness sensation and feels that he is not emptying fully. Denies fall or injury. He is not on blood thinners. He has not had a fever. Has no recent instrumentation of the bladder or urethra. I have reviewed the following nursing documentation:  Allergies: Patient has no known allergies.     Past medical history:   Past Medical History:   Diagnosis Date    Actinic keratosis     BPH (benign prostatic hypertrophy)     BPH w/o urinary obs/LUTS     Bulging disc 1990s    lumbar    Chest pain     Contusion of back     Costochondritis 9/7/2011    Degeneration of intervertebral disc, site unspecified     Diarrhea 9/7/2011    Dysphagia, unspecified     Eosinophilic esophagitis [id number]    DR Raymond Lomeli, EGD    HCV (hepatitis C virus) 12/1997    Hyperpotassemia     Lumbago     Lumbar strain     acute     Other malignant neoplasm of skin, site unspecified     Pain in joint, shoulder region     Partial Achilles tendon tear     LT     Pharyngitis 9/7/2011    Schatzki's ring [phone]    DR Buffalo Pouch, EGD    Screening colonoscopy     colonoscopy (benign polyps) every 5 years 3/2007    Stricture and stenosis of esophagus      Past surgical history:   Past Surgical History:   Procedure Laterality Date    APPENDECTOMY  02/2000        COLONOSCOPY  2012    DR Lenord Frankel PECK, COLONOSCOPY, NO POLYPS, REPEAT 5 YRS    EXTERNAL EAR SURGERY  1/13    left - SCC    KNEE ARTHROSCOPY  1/2000    left    TURP  1/25/16    UPPER GASTROINTESTINAL ENDOSCOPY  [id number]    DR Carlo Spurling, SCHATZKI'S RING AND EOSINOHILIC ESOPHAGITIS       Home medications:   Discharge Medication List as of 2/20/2023  4:52 PM        CONTINUE these medications which have NOT CHANGED    Details   naproxen (NAPROSYN) 500 MG tablet Take 1 tablet by mouth 2 times daily (with meals), Disp-60 tablet, R-0Normal      naproxen (NAPROSYN) 500 MG tablet Take 1 tablet by mouth 2 times daily (with meals), Disp-60 tablet, R-0Normal      atorvastatin (LIPITOR) 40 MG tablet TAKE 1 TABLET BY MOUTH EVERY DAY, Disp-90 tablet, R-3Normal      famotidine (PEPCID) 20 MG tablet Take 20 mg by mouth dailyHistorical Med      Cholecalciferol (VITAMIN D3) 5000 UNITS CAPS Take 1 capsule by mouth daily Indications: 2000 units daily per PT Historical Med      RESTASIS 0.05 % ophthalmic emulsion Place 1 drop into both eyes 2 times daily. , R-1      aspirin 81 MG EC tablet Take 81 mg by mouth daily. multivitamin (THERAGRAN) per tablet Take 1 tablet by mouth daily. Social history:  reports that he has been smoking cigarettes. He has a 19.00 pack-year smoking history. He has never used smokeless tobacco. He reports current alcohol use of about 1.0 standard drink per week. He reports that he does not use drugs.     Family history:    Family History   Problem Relation Age of Onset    Depression Mother     Other Mother         CLAVICLE FX    Cancer Father         rectal    Alcohol Abuse Father     Other Father         vasc. heart valve age 61    High Blood Pressure Brother     Other Brother 48        ALS    Heart Disease Maternal Grandmother     Heart Disease Maternal Grandfather     Cancer Maternal Grandfather         colon    Tuberculosis Paternal Grandfather     Cancer Paternal Grandfather        Exam  ED Triage Vitals [02/20/23 1229]   BP Temp Temp Source Heart Rate Resp SpO2 Height Weight   136/85 98.6 °F (37 °C) Oral 62 (!) 2 97 % -- 230 lb (104.3 kg)     Nursing note and vitals reviewed. General: Awake and alert in no distress  Head: Cephalic atraumatic  ENT: Moist mucous membranes  Eyes: Anicteric sclera. No discharge. Neck: Supple. Trachea midline. Cardiovascular: RRR; no murmurs rubs or gallops  Pulmonary/Chest: Effort normal. No respiratory distress. No wheezes  Abdominal: Soft. No distension. No tenderness  Neurological: Alert and oriented. Face symmetric. Speech is clear. Skin: Warm and dry. No rash. Procedures    Radiology  CT ABDOMEN PELVIS WO CONTRAST Additional Contrast? None   Final Result   1. No nephrolithiasis or obstructive uropathy. 2. Diffuse bladder mucosal thickening with dense fluid/hematuria in the   lumen. Underlying cystitis may be considered. 3. Mild prostatomegaly. Chronic bladder outlet obstruction cannot be   excluded. Correlate with urologic history.              Labs  Results for orders placed or performed during the hospital encounter of 02/20/23   CBC with Auto Differential   Result Value Ref Range    WBC 8.7 4.0 - 11.0 K/uL    RBC 4.62 4.20 - 5.90 M/uL    Hemoglobin 14.6 13.5 - 17.5 g/dL    Hematocrit 43.8 40.5 - 52.5 %    MCV 94.7 80.0 - 100.0 fL    MCH 31.7 26.0 - 34.0 pg    MCHC 33.5 31.0 - 36.0 g/dL    RDW 12.6 12.4 - 15.4 %    Platelets 160 791 - 227 K/uL    MPV 8.4 5.0 - 10.5 fL    Neutrophils % 64.3 %    Lymphocytes % 21.5 %    Monocytes % 11.0 %    Eosinophils % 2.5 %    Basophils % 0.7 %    Neutrophils Absolute 5.6 1.7 - 7.7 K/uL    Lymphocytes Absolute 1.9 1.0 - 5.1 K/uL    Monocytes Absolute 1.0 0.0 - 1.3 K/uL    Eosinophils Absolute 0.2 0.0 - 0.6 K/uL    Basophils Absolute 0.1 0.0 - 0.2 K/uL   CMP w/ Reflex to MG   Result Value Ref Range    Sodium 137 136 - 145 mmol/L    Potassium reflex Magnesium 5.1 3.5 - 5.1 mmol/L    Chloride 103 99 - 110 mmol/L    CO2 25 21 - 32 mmol/L    Anion Gap 9 3 - 16    Glucose 130 (H) 70 - 99 mg/dL    BUN 13 7 - 20 mg/dL Creatinine 1.0 0.8 - 1.3 mg/dL    Est, Glom Filt Rate >60 >60    Calcium 9.4 8.3 - 10.6 mg/dL    Total Protein 7.5 6.4 - 8.2 g/dL    Albumin 4.0 3.4 - 5.0 g/dL    Albumin/Globulin Ratio 1.1 1.1 - 2.2    Total Bilirubin 0.3 0.0 - 1.0 mg/dL    Alkaline Phosphatase 64 40 - 129 U/L    ALT 18 10 - 40 U/L    AST 21 15 - 37 U/L   Protime-INR   Result Value Ref Range    Protime 13.8 11.7 - 14.5 sec    INR 1.07 0.87 - 1.14   APTT   Result Value Ref Range    aPTT 26.2 23.0 - 34.3 sec   Urinalysis with Reflex to Culture    Specimen: Urine   Result Value Ref Range    Color, UA RED (A) Straw/Yellow    Clarity, UA TURBID (A) Clear    Glucose, Ur Negative Negative mg/dL    Bilirubin Urine Negative Negative    Ketones, Urine Negative Negative mg/dL    Specific Gravity, UA 1.015 1.005 - 1.030    Blood, Urine LARGE (A) Negative    pH, UA 6.5 5.0 - 8.0    Protein,  (A) Negative mg/dL    Urobilinogen, Urine 0.2 <2.0 E.U./dL    Nitrite, Urine Negative Negative    Leukocyte Esterase, Urine Negative Negative    Microscopic Examination YES     Urine Type NotGiven     Urine Reflex to Culture Not Indicated    Microscopic Urinalysis   Result Value Ref Range    WBC, UA 0-2 0 - 5 /HPF    RBC, UA >100 (A) 0 - 4 /HPF    Bacteria, UA Rare (A) None Seen /HPF    Urinalysis Comments see below        Screenings                    Medications Given During ED Visit  Medications - No data to display    Records Reviewed  Patient is not on any anticoagulation. He has not had recent urology instrumentation. Chronic Conditions affecting care   has a past medical history of Actinic keratosis, BPH (benign prostatic hypertrophy), BPH w/o urinary obs/LUTS, Bulging disc (1990s), Chest pain, Contusion of back, Costochondritis (9/7/2011), Degeneration of intervertebral disc, site unspecified, Diarrhea (9/7/2011), Dysphagia, unspecified, Eosinophilic esophagitis ([id number]), HCV (hepatitis C virus) (12/1997), Hyperpotassemia, Lumbago, Lumbar strain, Other malignant neoplasm of skin, site unspecified, Pain in joint, shoulder region, Partial Achilles tendon tear, Pharyngitis (9/7/2011), Schatzki's ring ([id number]), Screening colonoscopy, and Stricture and stenosis of esophagus. MDM and ED Course  Presented with gross hematuria and a sensation of complete emptying. Essentially started spontaneously. Reviewed chart. My exam he is awake and alert. Afebrile. Hemodynamically stable. He is in no distress. There is really no abdominal tenderness. We will place an IV and check labs. We will check his platelets and hemoglobin. Check coags. We will check a UA for signs of infection. Also obtain a CT scan to assess for any signs of painless hematuria including a mass, kidney stone, as well as a bladder hemorrhage. We will obtain a bladder scan to see if he is retaining. If he is in there are some clots on the CAT scan we may have to place a three-way Mackey for irrigation. Addendum -UA shows some hematuria. CT scan shows possibility of cystitis. Otherwise no acute findings. He has been able to urinate here. I did a bladder scan postvoid and there was nothing left. Patient feeling much better. Will start on antibiotics. Follow-up with urology discussed. Final Impression  1. Hemorrhagic cystitis        Blood pressure (!) 151/85, pulse 57, temperature 98.6 °F (37 °C), temperature source Oral, resp. rate 11, weight 230 lb (104.3 kg), SpO2 99 %.      Disposition:  DISPOSITION Decision To Discharge 02/20/2023 04:28:46 PM      Patient Referrals:  [facility] Emergency [facility]  [address] [facility]  [phone]    If symptoms worsen    Discharge Medications:  Discharge Medication List as of 2/20/2023  4:52 PM        START taking these medications    Details   cephALEXin (KEFLEX) 500 MG capsule Take 1 capsule by mouth 3 times daily for 7 days, Disp-21 capsule, R-0Print             This chart was generated using the [address] [place] [address] [address]  dictation system. I created this record but it may contain dictation errors given the limitations of this technology.        Blas Lindo 8, DO  02/20/23 2042

## 2023-02-20 NOTE — TELEPHONE ENCOUNTER
Patient has blood in his urine, started on Sunday morning around 3 am, and he is passing clots.  He is going to the ER. JUST JOE

## 2023-03-29 DIAGNOSIS — R73.9 HYPERGLYCEMIA: ICD-10-CM

## 2023-03-29 DIAGNOSIS — Z12.5 SCREENING PSA (PROSTATE SPECIFIC ANTIGEN): ICD-10-CM

## 2023-03-29 DIAGNOSIS — N40.1 BENIGN PROSTATIC HYPERPLASIA WITH NOCTURIA: ICD-10-CM

## 2023-03-29 DIAGNOSIS — R35.1 BENIGN PROSTATIC HYPERPLASIA WITH NOCTURIA: ICD-10-CM

## 2023-03-29 DIAGNOSIS — E78.5 HYPERLIPIDEMIA, UNSPECIFIED HYPERLIPIDEMIA TYPE: ICD-10-CM

## 2023-03-29 DIAGNOSIS — E87.5 HYPERPOTASSEMIA: ICD-10-CM

## 2023-03-29 LAB
ALBUMIN SERPL-MCNC: 4.3 G/DL (ref 3.4–5)
ALBUMIN/GLOB SERPL: 1.5 {RATIO} (ref 1.1–2.2)
ALP SERPL-CCNC: 66 U/L (ref 40–129)
ALT SERPL-CCNC: 36 U/L (ref 10–40)
ANION GAP SERPL CALCULATED.3IONS-SCNC: 13 MMOL/L (ref 3–16)
AST SERPL-CCNC: 21 U/L (ref 15–37)
BILIRUB SERPL-MCNC: 0.5 MG/DL (ref 0–1)
BUN SERPL-MCNC: 13 MG/DL (ref 7–20)
CALCIUM SERPL-MCNC: 9.1 MG/DL (ref 8.3–10.6)
CHLORIDE SERPL-SCNC: 100 MMOL/L (ref 99–110)
CHOLEST SERPL-MCNC: 146 MG/DL (ref 0–199)
CO2 SERPL-SCNC: 25 MMOL/L (ref 21–32)
CREAT SERPL-MCNC: 1 MG/DL (ref 0.8–1.3)
EST. AVERAGE GLUCOSE BLD GHB EST-MCNC: 128.4 MG/DL
GFR SERPLBLD CREATININE-BSD FMLA CKD-EPI: >60 ML/MIN/{1.73_M2}
GLUCOSE SERPL-MCNC: 113 MG/DL (ref 70–99)
HBA1C MFR BLD: 6.1 %
HDLC SERPL-MCNC: 55 MG/DL (ref 40–60)
LDLC SERPL CALC-MCNC: 75 MG/DL
POTASSIUM SERPL-SCNC: 4.6 MMOL/L (ref 3.5–5.1)
PROT SERPL-MCNC: 7.2 G/DL (ref 6.4–8.2)
PSA SERPL DL<=0.01 NG/ML-MCNC: 3.79 NG/ML (ref 0–4)
SODIUM SERPL-SCNC: 138 MMOL/L (ref 136–145)
TRIGL SERPL-MCNC: 81 MG/DL (ref 0–150)
VLDLC SERPL CALC-MCNC: 16 MG/DL

## 2023-04-17 RX ORDER — ATORVASTATIN CALCIUM 40 MG/1
TABLET, FILM COATED ORAL
Qty: 90 TABLET | Refills: 0 | Status: SHIPPED | OUTPATIENT
Start: 2023-04-17

## 2023-04-17 NOTE — TELEPHONE ENCOUNTER
LV 4/28/22 WITH DR LÓPEZ FOR PHYSICAL NV 5/12/23 The patient is a 9y Female complaining of ear pain.

## 2023-05-11 NOTE — PATIENT INSTRUCTIONS

## 2023-05-12 ENCOUNTER — OFFICE VISIT (OUTPATIENT)
Dept: FAMILY MEDICINE CLINIC | Age: 73
End: 2023-05-12
Payer: MEDICARE

## 2023-05-12 VITALS
HEART RATE: 74 BPM | WEIGHT: 237 LBS | HEIGHT: 71 IN | BODY MASS INDEX: 33.18 KG/M2 | SYSTOLIC BLOOD PRESSURE: 120 MMHG | OXYGEN SATURATION: 99 % | DIASTOLIC BLOOD PRESSURE: 72 MMHG

## 2023-05-12 DIAGNOSIS — E78.5 HYPERLIPIDEMIA, UNSPECIFIED HYPERLIPIDEMIA TYPE: ICD-10-CM

## 2023-05-12 DIAGNOSIS — K21.9 GASTROESOPHAGEAL REFLUX DISEASE, UNSPECIFIED WHETHER ESOPHAGITIS PRESENT: ICD-10-CM

## 2023-05-12 DIAGNOSIS — Z00.00 WELL ADULT EXAM: Primary | ICD-10-CM

## 2023-05-12 DIAGNOSIS — Z85.828 HISTORY OF NONMELANOMA SKIN CANCER: ICD-10-CM

## 2023-05-12 DIAGNOSIS — N40.1 BENIGN PROSTATIC HYPERPLASIA WITH NOCTURIA: ICD-10-CM

## 2023-05-12 DIAGNOSIS — R73.9 HYPERGLYCEMIA: ICD-10-CM

## 2023-05-12 DIAGNOSIS — R35.1 BENIGN PROSTATIC HYPERPLASIA WITH NOCTURIA: ICD-10-CM

## 2023-05-12 DIAGNOSIS — L57.0 ACTINIC KERATOSIS: ICD-10-CM

## 2023-05-12 PROCEDURE — 99397 PER PM REEVAL EST PAT 65+ YR: CPT | Performed by: FAMILY MEDICINE

## 2023-05-12 SDOH — ECONOMIC STABILITY: FOOD INSECURITY: WITHIN THE PAST 12 MONTHS, THE FOOD YOU BOUGHT JUST DIDN'T LAST AND YOU DIDN'T HAVE MONEY TO GET MORE.: NEVER TRUE

## 2023-05-12 SDOH — ECONOMIC STABILITY: HOUSING INSECURITY
IN THE LAST 12 MONTHS, WAS THERE A TIME WHEN YOU DID NOT HAVE A STEADY PLACE TO SLEEP OR SLEPT IN A SHELTER (INCLUDING NOW)?: NO

## 2023-05-12 SDOH — ECONOMIC STABILITY: INCOME INSECURITY: HOW HARD IS IT FOR YOU TO PAY FOR THE VERY BASICS LIKE FOOD, HOUSING, MEDICAL CARE, AND HEATING?: NOT HARD AT ALL

## 2023-05-12 SDOH — ECONOMIC STABILITY: FOOD INSECURITY: WITHIN THE PAST 12 MONTHS, YOU WORRIED THAT YOUR FOOD WOULD RUN OUT BEFORE YOU GOT MONEY TO BUY MORE.: NEVER TRUE

## 2023-05-12 NOTE — PROGRESS NOTES
Vaccine  Completed    COVID-19 Vaccine  Completed    AAA screen  Completed    Hepatitis C screen  Completed    Hepatitis A vaccine  Aged Out    Hib vaccine  Aged Out    Meningococcal (ACWY) vaccine  Aged Out    Prostate Specific Antigen (PSA) Screening or Monitoring  Discontinued       Assessment & Plan    Diagnosis Orders   1. Well adult exam        2. Benign prostatic hyperplasia with nocturia        3. Actinic keratosis        4. History of nonmelanoma skin cancer        5. Gastroesophageal reflux disease, unspecified whether esophagitis present        6. Hyperlipidemia, unspecified hyperlipidemia type        7.  Hyperglycemia          Routine f/u derm w/ skin ca/ ak  Pepcid for gerd  Lipitor for HLD  On vit d  Asa use d/w pt including risk/ benefit ratio  F/u urology - recent w/u for hematuria improved -  results reviewed  Labs 3/23 reviewed  Colonoscopy 10/22 okay other than divertic disease - f/u 5 y for repeat w/ fam hx  Vaccines reviewed -utd  Bunion dw/ pt  Voltaren gel encouraged for oa pain    The 10-year ASCVD risk score (Theresa LR, et al., 2019) is: 18.2%    Values used to calculate the score:      Age: 68 years      Sex: Male      Is Non- : No      Diabetic: No      Tobacco smoker: Yes      Systolic Blood Pressure: 387 mmHg      Is BP treated: No      HDL Cholesterol: 55 mg/dL      Total Cholesterol: 146 mg/dL           --Pebblse Marie MD

## 2023-07-24 ENCOUNTER — OFFICE VISIT (OUTPATIENT)
Dept: DERMATOLOGY | Age: 73
End: 2023-07-24
Payer: MEDICARE

## 2023-07-24 DIAGNOSIS — D22.9 MULTIPLE NEVI: ICD-10-CM

## 2023-07-24 DIAGNOSIS — Z86.018 HISTORY OF DYSPLASTIC NEVUS: ICD-10-CM

## 2023-07-24 DIAGNOSIS — Z85.828 HISTORY OF NONMELANOMA SKIN CANCER: Primary | ICD-10-CM

## 2023-07-24 DIAGNOSIS — L82.1 SEBORRHEIC KERATOSIS: ICD-10-CM

## 2023-07-24 DIAGNOSIS — D48.5 NEOPLASM OF UNCERTAIN BEHAVIOR OF SKIN: ICD-10-CM

## 2023-07-24 DIAGNOSIS — L57.0 AK (ACTINIC KERATOSIS): ICD-10-CM

## 2023-07-24 PROCEDURE — 11102 TANGNTL BX SKIN SINGLE LES: CPT | Performed by: DERMATOLOGY

## 2023-07-24 PROCEDURE — 4004F PT TOBACCO SCREEN RCVD TLK: CPT | Performed by: DERMATOLOGY

## 2023-07-24 PROCEDURE — 11103 TANGNTL BX SKIN EA SEP/ADDL: CPT | Performed by: DERMATOLOGY

## 2023-07-24 PROCEDURE — G8417 CALC BMI ABV UP PARAM F/U: HCPCS | Performed by: DERMATOLOGY

## 2023-07-24 PROCEDURE — 99213 OFFICE O/P EST LOW 20 MIN: CPT | Performed by: DERMATOLOGY

## 2023-07-24 PROCEDURE — 1123F ACP DISCUSS/DSCN MKR DOCD: CPT | Performed by: DERMATOLOGY

## 2023-07-24 PROCEDURE — 3017F COLORECTAL CA SCREEN DOC REV: CPT | Performed by: DERMATOLOGY

## 2023-07-24 PROCEDURE — 17004 DESTROY PREMAL LESIONS 15/>: CPT | Performed by: DERMATOLOGY

## 2023-07-24 PROCEDURE — G8427 DOCREV CUR MEDS BY ELIG CLIN: HCPCS | Performed by: DERMATOLOGY

## 2023-07-24 RX ORDER — ATORVASTATIN CALCIUM 40 MG/1
TABLET, FILM COATED ORAL
Qty: 90 TABLET | Refills: 0 | Status: SHIPPED | OUTPATIENT
Start: 2023-07-24

## 2023-07-24 NOTE — PATIENT INSTRUCTIONS
Biopsy Wound Care Instructions    Keep the bandage in place for 24 hours. Cleanse the wound with mild soapy water daily  Gently dry the area. Apply Vaseline or petroleum jelly to the wound using a cotton tipped applicator. Cover with a clean bandage. Repeat this process until the biopsy site is healed. If you had stitches placed, continue treating the site until the stitches are removed. Remember to make an appointment to return to have your stitches removed by our staff. You may shower and bathe as usual.       ** Biopsy results generally take around 7 business days to come back. If you have not heard from us by then, please call the office at [phone]. *Please note that biopsy results are released to both the patient and physician at the same time in [address] [place] [address]. Please allow time for your physician to review the results. One of our staff members will reach out to you with the results and plan.

## 2023-07-24 NOTE — PROGRESS NOTES
[facility]  Shasta Pastor MD  [address]  1950    68 y.o. male     Date of Visit: 7/24/2023    Chief Complaint: f/u skin cancer and AK's  Chief Complaint   Patient presents with    Skin Exam     Last seen: 7-2022    History of Present Illness:    He lives on [facility] [address] [facility] [facility]. 1. Here for a full skin check for hx of NMSC. No probs with previous sites. New concerns noted below. 2. He has scattered asx brown lesions on the trunk and extremities, present for many years; no change in size/shape/color of any lesions; no bleeding lesions. 3. Hx of dysplastic nevus - lower back (mod dysplastic, removed with bx 4-2021). 4. He has scattered rough lesions on the face, shins. 5. He has a few concerning pink spots on the R shin - one is tender.    + hx of NMSC - BCC and SCC (R thigh and face), last was ~ 2013  No family hx of melanoma. He wears sunscreen - plays golf. Always wears a hat. Often doesn't reapply. Review of Systems:  Gen: Feels well, good sense of health. Skin: No changing moles or lesions. Past Medical History, Family History, Surgical History, Medications and Allergies reviewed.     Past Medical History:   Diagnosis Date    Actinic keratosis     BPH (benign prostatic hypertrophy)     BPH w/o urinary obs/LUTS     Bulging disc 1990s    lumbar    Chest pain     Contusion of back     Costochondritis 9/7/2011    Degeneration of intervertebral disc, site unspecified     Diarrhea 9/7/2011    Dysphagia, unspecified     Eosinophilic esophagitis [id number]    DR Melchor Ruiz, EGD    HCV (hepatitis C virus) 12/1997    Hyperpotassemia     Lumbago     Lumbar strain     acute     Other malignant neoplasm of skin, site unspecified     Pain in joint, shoulder region     Partial Achilles tendon tear     LT     Pharyngitis 9/7/2011    Schatzki's ring [phone]    DR Melchor Ruiz, EGD    Screening colonoscopy     colonoscopy (benign polyps) every 5

## 2023-07-28 LAB — DERMATOLOGY PATHOLOGY REPORT: NORMAL

## 2023-10-23 RX ORDER — ATORVASTATIN CALCIUM 40 MG/1
TABLET, FILM COATED ORAL
Qty: 90 TABLET | Refills: 0 | Status: SHIPPED | OUTPATIENT
Start: 2023-10-23

## 2023-11-27 ENCOUNTER — OFFICE VISIT (OUTPATIENT)
Dept: DERMATOLOGY | Age: 73
End: 2023-11-27
Payer: MEDICARE

## 2023-11-27 DIAGNOSIS — L57.0 AK (ACTINIC KERATOSIS): Primary | ICD-10-CM

## 2023-11-27 PROCEDURE — 17000 DESTRUCT PREMALG LESION: CPT | Performed by: DERMATOLOGY

## 2023-11-27 PROCEDURE — 17003 DESTRUCT PREMALG LES 2-14: CPT | Performed by: DERMATOLOGY

## 2023-11-27 NOTE — PROGRESS NOTES
[facility]  Cathy Man MD  [address] [facility]  1950    68 y.o. male     Date of Visit: 11/27/2023    Chief Complaint: f/u AK's  Chief Complaint   Patient presents with    Follow-up     AK      Last seen: 7-2023 for FSE - hx NMSC    History of Present Illness:    He lives [place] [address] [facility] [address] [facility] [facility]. Here for follow-up after biopsy of a few lesions on the right shin at his last visit in July. Both are read as AK's-the one on the right lateral shin read as HAK, diffusely transected at the deep margin. No problems noted but both are still rough and scaly. He also has a few other new rough spots-right hand as well as the right temple and forehead. These are asymptomatic.      + hx of NMSC - BCC and SCC (R thigh and face), last was ~ 2013  No family hx of melanoma. He wears sunscreen - plays golf. Always wears a hat. Often doesn't reapply. Review of Systems:  Gen: Feels well, good sense of health. Skin: No changing moles or lesions. Past Medical History, Family History, Surgical History, Medications and Allergies reviewed.     Past Medical History:   Diagnosis Date    Actinic keratosis     BPH (benign prostatic hypertrophy)     BPH w/o urinary obs/LUTS     Bulging disc 1990s    lumbar    Chest pain     Contusion of back     Costochondritis 9/7/2011    Degeneration of intervertebral disc, site unspecified     Diarrhea 9/7/2011    Dysphagia, unspecified     Eosinophilic esophagitis [id number]    DR Chioma Handley, EGD    HCV (hepatitis C virus) 12/1997    Hyperpotassemia     Lumbago     Lumbar strain     acute     Other malignant neoplasm of skin, site unspecified     Pain in joint, shoulder region     Partial Achilles tendon tear     LT     Pharyngitis 9/7/2011    Schatzki's ring [id number]    DR Chioma Handley, EGD    Screening colonoscopy     colonoscopy (benign polyps) every 5 years 3/2007    Stricture and stenosis of esophagus        Past Surgical History:

## 2024-02-02 RX ORDER — ATORVASTATIN CALCIUM 40 MG/1
TABLET, FILM COATED ORAL
Qty: 90 TABLET | Refills: 0 | Status: SHIPPED | OUTPATIENT
Start: 2024-02-02

## 2024-02-09 ENCOUNTER — TELEPHONE (OUTPATIENT)
Dept: FAMILY MEDICINE CLINIC | Age: 74
End: 2024-02-09

## 2024-02-09 DIAGNOSIS — E78.5 HYPERLIPIDEMIA, UNSPECIFIED HYPERLIPIDEMIA TYPE: Primary | ICD-10-CM

## 2024-02-09 RX ORDER — ATORVASTATIN CALCIUM 40 MG/1
40 TABLET, FILM COATED ORAL DAILY
Qty: 90 TABLET | Refills: 0 | Status: SHIPPED | OUTPATIENT
Start: 2024-02-09

## 2024-02-09 NOTE — TELEPHONE ENCOUNTER
[facility] is calling to get a refill for pt    Atorvastatin 40 mg  #90    BENJI Rojas  [phone]    They wanted this to be an emergency fill because he is out.

## 2024-04-01 ENCOUNTER — TELEPHONE (OUTPATIENT)
Dept: FAMILY MEDICINE CLINIC | Age: 74
End: 2024-04-01

## 2024-04-01 DIAGNOSIS — R73.9 HYPERGLYCEMIA: ICD-10-CM

## 2024-04-01 DIAGNOSIS — Z12.5 SCREENING PSA (PROSTATE SPECIFIC ANTIGEN): Primary | ICD-10-CM

## 2024-04-01 DIAGNOSIS — E78.5 HYPERLIPIDEMIA, UNSPECIFIED HYPERLIPIDEMIA TYPE: ICD-10-CM

## 2024-04-01 NOTE — TELEPHONE ENCOUNTER
Pt made an appt for May 16, 24 for his physical.  Pt would like to have his blood work done prior to his visit.  He will go to a [facility] facility.      Please place orders and we can all him to let him know the orders are in.

## 2024-04-03 NOTE — PATIENT INSTRUCTIONS
You can call [phone] to schedule this.      GENERAL OFFICE POLICIES      Telephone Calls: Messages will be answered within 1-2 business days, unless the provider is out of the office.  If it is urgent a covering provider will answer. (this does not include Medication refills).    MyChart:  We recommend all patients sign up for [employer].  Through this portal you can see your lab results, request refills, schedule appointments, pay your bill and send messages to the office.   MyChart messages will be answered within 1-2 business days unless the provider is out of the office.  For urgent matters, please call the office.  Appointments:  All appointments must be scheduled.  We ask all patients to schedule their next follow up appointment before they leave the office to make sure you will be able to be seen before you run out of medications.  24 hours notice is required to cancel or reschedule an appointment to avoid being marked as a no show.  You may be dismissed from the practice after 3 no shows.    LATE for Appointment: If you are 15 or more minutes late for your appointment, you may be asked to reschedule.  MA/LAB APPTS: Must be scheduled, cannot accept walk in lab visits.  We only draw labs for patients established in our office.  We only do injections for medications ordered by our office.  Acute Sick Visits:  Nothing other than acute complaint will be addressed at this visit.  TRADITIONAL MEDICARE  DOES NOT COVER PHYSICALS  MEDICARE WELLNESS VISITS: These are NOT physicals but the free annual visit offered by Medicare to discuss wellness issues. Medication refills, checkups, etc. will not be addressed during this visit.  Medication Refills: Refills are handled electronically so please contact your pharmacy for medication refills even if current refills have been exhausted. If you are on a controlled medication you will be referred to a specialist (pain specialist, psychiatry, etc).   Forms: There is a $35

## 2024-04-04 ENCOUNTER — OFFICE VISIT (OUTPATIENT)
Dept: FAMILY MEDICINE CLINIC | Age: 74
End: 2024-04-04
Payer: MEDICARE

## 2024-04-04 VITALS
OXYGEN SATURATION: 98 % | HEART RATE: 73 BPM | WEIGHT: 239.6 LBS | SYSTOLIC BLOOD PRESSURE: 134 MMHG | HEIGHT: 71 IN | DIASTOLIC BLOOD PRESSURE: 78 MMHG | BODY MASS INDEX: 33.54 KG/M2

## 2024-04-04 DIAGNOSIS — M54.6 ACUTE BILATERAL THORACIC BACK PAIN: Primary | ICD-10-CM

## 2024-04-04 PROCEDURE — 4004F PT TOBACCO SCREEN RCVD TLK: CPT

## 2024-04-04 PROCEDURE — 3017F COLORECTAL CA SCREEN DOC REV: CPT

## 2024-04-04 PROCEDURE — 99213 OFFICE O/P EST LOW 20 MIN: CPT

## 2024-04-04 PROCEDURE — G8427 DOCREV CUR MEDS BY ELIG CLIN: HCPCS

## 2024-04-04 PROCEDURE — G8417 CALC BMI ABV UP PARAM F/U: HCPCS

## 2024-04-04 PROCEDURE — 1123F ACP DISCUSS/DSCN MKR DOCD: CPT

## 2024-04-04 ASSESSMENT — PATIENT HEALTH QUESTIONNAIRE - PHQ9
SUM OF ALL RESPONSES TO PHQ QUESTIONS 1-9: 0
SUM OF ALL RESPONSES TO PHQ9 QUESTIONS 1 & 2: 0
SUM OF ALL RESPONSES TO PHQ QUESTIONS 1-9: 0
2. FEELING DOWN, DEPRESSED OR HOPELESS: NOT AT ALL
SUM OF ALL RESPONSES TO PHQ QUESTIONS 1-9: 0
1. LITTLE INTEREST OR PLEASURE IN DOING THINGS: NOT AT ALL
SUM OF ALL RESPONSES TO PHQ QUESTIONS 1-9: 0

## 2024-04-04 ASSESSMENT — ENCOUNTER SYMPTOMS
COLOR CHANGE: 0
BACK PAIN: 1
COUGH: 0
SHORTNESS OF BREATH: 0
CHEST TIGHTNESS: 0

## 2024-04-04 NOTE — PROGRESS NOTES
Raul Warren (:  1950) is a 74 y.o. male,Established patient, here for evaluation of the following chief complaint(s):  Back Pain (Pt C/O Back pain x 3 weeks )         ASSESSMENT/PLAN:  1. Acute bilateral thoracic back pain  -Slipped thoracic disc versus thoracic muscular strain.  -Treatment options discussed.  Patient declines prescriptions at this time.  Declined physical therapy referral.  -We will proceed with MRI evaluation to rule out slipped disc of the back.  -Recommending RICE, heat, stretching.  Information provided in AVS.  -Follow-up if no improvement.  Can consider referral at that time.  -     MRI THORACIC SPINE WO CONTRAST; Future      Return if symptoms worsen or fail to improve.         Subjective   SUBJECTIVE/OBJECTIVE:  HPI  He has been having back pain for the last few months. He was moving some bags of rocks at [employer] and felt a little pop, felt off. He took it easy over the winter, and recently started playing golf again. He went on a week long golf trip and his back is now worse. He has multiple sore spots throughout his mid back down into his low back. He has been doing some stretching without benefit. He has been using aleve, helps ease the pain. No ice or heat, used [employer] for the trip, none since. When he puts in his eye drops, or twists to the left, his back can be more tight. Right mid back is the worse. He has done PT in the past, some benefit then. Back has mostly been fine the last 10 years until this. He has some right knee OA, unchanged. Possible big toe tendonitis, possible bunion right foot. No loss of bowel or bladder control, no shooting pains down his legs. Pain was as high as 5/10, now around 1-3/10 range. Medical massage did not help.       Review of Systems   Constitutional:  Negative for activity change, diaphoresis and fatigue.   Eyes:  Negative for visual disturbance.   Respiratory:  Negative for cough, chest tightness and shortness of breath.

## 2024-04-18 ENCOUNTER — TELEPHONE (OUTPATIENT)
Dept: FAMILY MEDICINE CLINIC | Age: 74
End: 2024-04-18

## 2024-04-18 NOTE — TELEPHONE ENCOUNTER
Looks like results came in Tuesday. MRI shows T3 through T5 bulging discs nearing impact with the nerves. There is also some T8-T9 disc bulging complicating your back as well. These out of place spinal discs are likely the cause of your pain. I can refer you to PT or a back specialist, or both

## 2024-04-18 NOTE — TELEPHONE ENCOUNTER
Called and informed pt, he would like to wait and call back for referral with which one and location.

## 2024-04-18 NOTE — TELEPHONE ENCOUNTER
Patient was calling to check on his MRI results from last week. He said a week was to long to not receive them.    Can we please give him a call

## 2024-04-24 ENCOUNTER — TELEPHONE (OUTPATIENT)
Dept: FAMILY MEDICINE CLINIC | Age: 74
End: 2024-04-24

## 2024-05-13 DIAGNOSIS — Z12.5 SCREENING PSA (PROSTATE SPECIFIC ANTIGEN): ICD-10-CM

## 2024-05-13 DIAGNOSIS — E78.5 HYPERLIPIDEMIA, UNSPECIFIED HYPERLIPIDEMIA TYPE: ICD-10-CM

## 2024-05-13 DIAGNOSIS — R73.9 HYPERGLYCEMIA: ICD-10-CM

## 2024-05-13 LAB
ALBUMIN SERPL-MCNC: 4.3 G/DL (ref 3.4–5)
ALBUMIN/GLOB SERPL: 1.5 {RATIO} (ref 1.1–2.2)
ALP SERPL-CCNC: 64 U/L (ref 40–129)
ALT SERPL-CCNC: 24 U/L (ref 10–40)
ANION GAP SERPL CALCULATED.3IONS-SCNC: 13 MMOL/L (ref 3–16)
AST SERPL-CCNC: 22 U/L (ref 15–37)
BILIRUB SERPL-MCNC: 0.9 MG/DL (ref 0–1)
BUN SERPL-MCNC: 12 MG/DL (ref 7–20)
CALCIUM SERPL-MCNC: 8.9 MG/DL (ref 8.3–10.6)
CHLORIDE SERPL-SCNC: 99 MMOL/L (ref 99–110)
CHOLEST SERPL-MCNC: 136 MG/DL (ref 0–199)
CO2 SERPL-SCNC: 26 MMOL/L (ref 21–32)
CREAT SERPL-MCNC: 1 MG/DL (ref 0.8–1.3)
GFR SERPLBLD CREATININE-BSD FMLA CKD-EPI: 79 ML/MIN/{1.73_M2}
GLUCOSE SERPL-MCNC: 95 MG/DL (ref 70–99)
HDLC SERPL-MCNC: 55 MG/DL (ref 40–60)
LDLC SERPL CALC-MCNC: 63 MG/DL
POTASSIUM SERPL-SCNC: 4.5 MMOL/L (ref 3.5–5.1)
PROT SERPL-MCNC: 7.1 G/DL (ref 6.4–8.2)
PSA SERPL DL<=0.01 NG/ML-MCNC: 4.51 NG/ML (ref 0–4)
SODIUM SERPL-SCNC: 138 MMOL/L (ref 136–145)
TRIGL SERPL-MCNC: 89 MG/DL (ref 0–150)
VLDLC SERPL CALC-MCNC: 18 MG/DL

## 2024-05-14 ENCOUNTER — HOSPITAL ENCOUNTER (OUTPATIENT)
Dept: PHYSICAL THERAPY | Age: 74
Setting detail: THERAPIES SERIES
Discharge: HOME | End: 2024-05-14
Payer: MEDICARE

## 2024-05-14 DIAGNOSIS — R53.1 DECREASED STRENGTH, ENDURANCE, AND MOBILITY: ICD-10-CM

## 2024-05-14 DIAGNOSIS — M25.69 DECREASED RANGE OF MOTION OF TRUNK AND BACK: ICD-10-CM

## 2024-05-14 DIAGNOSIS — R68.89 DECREASED STRENGTH, ENDURANCE, AND MOBILITY: ICD-10-CM

## 2024-05-14 DIAGNOSIS — Z74.09 DECREASED STRENGTH, ENDURANCE, AND MOBILITY: ICD-10-CM

## 2024-05-14 DIAGNOSIS — R29.898 DECREASED STRENGTH OF TRUNK AND BACK: Primary | ICD-10-CM

## 2024-05-14 LAB
EST. AVERAGE GLUCOSE BLD GHB EST-MCNC: 128.4 MG/DL
HBA1C MFR BLD: 6.1 %

## 2024-05-14 PROCEDURE — 97161 PT EVAL LOW COMPLEX 20 MIN: CPT

## 2024-05-14 PROCEDURE — 97110 THERAPEUTIC EXERCISES: CPT

## 2024-05-14 PROCEDURE — 97530 THERAPEUTIC ACTIVITIES: CPT

## 2024-05-14 NOTE — PLAN OF CARE
conditions  [] Vertigo  [] Syncope  [] Kidney Failure  [] Cancer  [] Pregnancy  [] Incontinence   Other Co-morbidities not listed:       OBJECTIVE EXAMINATION     5/14/24  ROM/Strength: (Blank cells denote NT)      Mvmt (norm) ROM L ROM R Notes MMT L MMT R Notes     CERVICAL Flex (60) 50       Ext (70) 60       SB(45)          Rotation (80)             SHOULDER Flexion (180) 180 180  5 5     Abduction (180) 180 180  5 5     ER -0    5 5     ER -90 (90)          IR -0    5 5     IR -90 (70)      Mid Trap grossly 4-/5      ELBOW Flex/biceps (140)          Ext/triceps (0)          Pronation (80)          Supination (80)             WRIST Flexion (60)          Extension (60)          RD (20)          UD (20)           N/a N/a          Mvmt (norm) ROM L ROM R Notes MMT L MMT R Notes     LUMBAR   Flex (90) 70       Ext (25) 25 Mild low back pain      SB (25) 25 25        Lumbar Rot tight Tight            HIP Flex (120)    5 5     Abd (45)    4+ 4+     ER (50)          IR (45)          Ext (20)         KNEE   Flex (140)          Ext (0)             ANKLE DF (20)          PF (50)          Inversion (30)          Eversion (20)           Repeated Movements: [] Normal  [] Abnormal [] N/A    Palpation:   Patient reported tenderness with palpation  Location:Mid thoracic, mid lumbar     Posture:   forward head    Bandages/Dressings/Incisions:  Not Applicable    Dermatomes: Abnormal findings listed below  All WNL    Myotomes: Abnormal findings listed below  All WNL    Reflexes: Abnormal findings listed below  All reflexes WNL (2+)    Specific Joint Mobility Testing/Accessory Motions:      NA    Gait:    Pattern: WNL  Assistive Device Used: no AD    Special Tests:   N/A    Balance:  [x] WNL      [] NT       [] Dysfunction noted  Comment:     Falls Risk Assessment (30 days):   Falls Risk assessed and no intervention required.  Time Up and Go (TUG):   Not Assessed        Exercises/Interventions     Therapeutic Ex (68674)

## 2024-05-15 NOTE — PATIENT INSTRUCTIONS

## 2024-05-16 ENCOUNTER — OFFICE VISIT (OUTPATIENT)
Dept: FAMILY MEDICINE CLINIC | Age: 74
End: 2024-05-16
Payer: MEDICARE

## 2024-05-16 VITALS
BODY MASS INDEX: 32.9 KG/M2 | SYSTOLIC BLOOD PRESSURE: 128 MMHG | HEIGHT: 71 IN | HEART RATE: 62 BPM | WEIGHT: 235 LBS | OXYGEN SATURATION: 99 % | DIASTOLIC BLOOD PRESSURE: 76 MMHG

## 2024-05-16 DIAGNOSIS — M19.90 ARTHRITIS: ICD-10-CM

## 2024-05-16 DIAGNOSIS — N40.1 BENIGN PROSTATIC HYPERPLASIA WITH NOCTURIA: ICD-10-CM

## 2024-05-16 DIAGNOSIS — R97.20 ELEVATED PSA: ICD-10-CM

## 2024-05-16 DIAGNOSIS — E78.5 HYPERLIPIDEMIA, UNSPECIFIED HYPERLIPIDEMIA TYPE: ICD-10-CM

## 2024-05-16 DIAGNOSIS — R35.1 NOCTURIA: ICD-10-CM

## 2024-05-16 DIAGNOSIS — R35.1 BENIGN PROSTATIC HYPERPLASIA WITH NOCTURIA: ICD-10-CM

## 2024-05-16 DIAGNOSIS — J30.1 SEASONAL ALLERGIC RHINITIS DUE TO POLLEN: ICD-10-CM

## 2024-05-16 DIAGNOSIS — R73.9 HYPERGLYCEMIA: ICD-10-CM

## 2024-05-16 DIAGNOSIS — H04.123 DRY EYES: ICD-10-CM

## 2024-05-16 DIAGNOSIS — K21.9 GASTROESOPHAGEAL REFLUX DISEASE, UNSPECIFIED WHETHER ESOPHAGITIS PRESENT: ICD-10-CM

## 2024-05-16 DIAGNOSIS — M54.50 CHRONIC BILATERAL LOW BACK PAIN WITHOUT SCIATICA: ICD-10-CM

## 2024-05-16 DIAGNOSIS — Z85.828 HISTORY OF NONMELANOMA SKIN CANCER: ICD-10-CM

## 2024-05-16 DIAGNOSIS — G89.29 CHRONIC BILATERAL LOW BACK PAIN WITHOUT SCIATICA: ICD-10-CM

## 2024-05-16 DIAGNOSIS — Z00.00 WELL ADULT EXAM: Primary | ICD-10-CM

## 2024-05-16 LAB
BILIRUBIN, POC: NORMAL
BLOOD URINE, POC: NORMAL
CLARITY, POC: CLEAR
COLOR, POC: YELLOW
GLUCOSE URINE, POC: NORMAL
KETONES, POC: NORMAL
LEUKOCYTE EST, POC: NORMAL
NITRITE, POC: NORMAL
PH, POC: 6.5
PROTEIN, POC: NORMAL
SPECIFIC GRAVITY, POC: 1.02
UROBILINOGEN, POC: 0.2

## 2024-05-16 PROCEDURE — 99397 PER PM REEVAL EST PAT 65+ YR: CPT | Performed by: FAMILY MEDICINE

## 2024-05-16 PROCEDURE — 81002 URINALYSIS NONAUTO W/O SCOPE: CPT | Performed by: FAMILY MEDICINE

## 2024-05-16 RX ORDER — ATORVASTATIN CALCIUM 40 MG/1
40 TABLET, FILM COATED ORAL DAILY
Qty: 90 TABLET | Refills: 3 | Status: SHIPPED | OUTPATIENT
Start: 2024-05-16

## 2024-05-16 SDOH — ECONOMIC STABILITY: FOOD INSECURITY: WITHIN THE PAST 12 MONTHS, THE FOOD YOU BOUGHT JUST DIDN'T LAST AND YOU DIDN'T HAVE MONEY TO GET MORE.: NEVER TRUE

## 2024-05-16 SDOH — ECONOMIC STABILITY: FOOD INSECURITY: WITHIN THE PAST 12 MONTHS, YOU WORRIED THAT YOUR FOOD WOULD RUN OUT BEFORE YOU GOT MONEY TO BUY MORE.: NEVER TRUE

## 2024-05-16 SDOH — ECONOMIC STABILITY: INCOME INSECURITY: HOW HARD IS IT FOR YOU TO PAY FOR THE VERY BASICS LIKE FOOD, HOUSING, MEDICAL CARE, AND HEATING?: NOT HARD AT ALL

## 2024-05-16 ASSESSMENT — PATIENT HEALTH QUESTIONNAIRE - PHQ9
2. FEELING DOWN, DEPRESSED OR HOPELESS: NOT AT ALL
SUM OF ALL RESPONSES TO PHQ QUESTIONS 1-9: 0
SUM OF ALL RESPONSES TO PHQ9 QUESTIONS 1 & 2: 0
SUM OF ALL RESPONSES TO PHQ QUESTIONS 1-9: 0
1. LITTLE INTEREST OR PLEASURE IN DOING THINGS: NOT AT ALL

## 2024-05-16 NOTE — PROGRESS NOTES
2024    Raul Warren (:  1950) is a 74 y.o. male, here for a preventive medicine evaluation.  Chief Complaint   Patient presents with    Annual Exam     ANNUAL PHYSICAL      SOME LOW BACK PAIN - SINCE PULLING BAGS OF ROCK FOR LANDSCAPING LAST SUMMER - WORSENING AFTER GOLF TRIP FOR 7 DAYS  HAD MRI  GETTING TRIGGER POINT THERAPY  =INJECTIONS/ ELECTRICAL STIM  ACROSS LOW BACK AND INTO THORACIC SPINE  HAD PT ON TUESDAY - GOING AGAIN FRIDAY - HAS 8 VISITS SET UP  HAD MRI - SHOWING DDD  TIGHT MUSCLES MAINLY - NO SCIATICA/ RADICULAR SXS  GENERAL TIGHTNESS/ DISCOMFORT  TAKES ALEVE PERIODICALLY - TRIES NOT TO TAKE REGULARLY - ONLY IF MORE ACTIVE - PLAYS GOLF THIS AFTERNOON  Reviewed mri recently  Had turp  - seen by urology - dr. Quinn - who recently retired.  Mild weakening in stream at night usually 1x nocturia  No big change in diet - watches sugar a little but not model status.  Golfing a little less than usual w/ low back pain  Vision decent - has glasses but new script doesn't seem as good as hoped  Due for new rx soon - seen  - monitoring cataract for 10y  Restasis works well for dry eyes  Occ tinnitus - brief -hearing okay  No ha/ dizzy  3rd spring in a row w/ allergy sxs - this season brigette April was bad - uses allarest prn- bad cough  Backed off heartburn med - was on med regularly - may use pepcid more regularly if picks up.  No cp/ palp/ sob  No gi issues other than gerd  Oa in thumb/ right big toe w/ bunion on right side    BP Readings from Last 3 Encounters:   24 128/76   24 134/78   23 120/72     Pulse Readings from Last 3 Encounters:   24 62   24 73   23 74     Wt Readings from Last 3 Encounters:   24 106.6 kg (235 lb)   24 108.7 kg (239 lb 9.6 oz)   23 107.5 kg (237 lb)       Subjective   Patient Active Problem List   Diagnosis    Benign enlargement of prostate    Stricture and stenosis of esophagus    Chest pain    Actinic

## 2024-05-17 ENCOUNTER — HOSPITAL ENCOUNTER (OUTPATIENT)
Dept: PHYSICAL THERAPY | Age: 74
Setting detail: THERAPIES SERIES
Discharge: HOME | End: 2024-05-17
Payer: MEDICARE

## 2024-05-17 PROCEDURE — 97110 THERAPEUTIC EXERCISES: CPT

## 2024-05-17 PROCEDURE — 97530 THERAPEUTIC ACTIVITIES: CPT

## 2024-05-17 PROCEDURE — 97140 MANUAL THERAPY 1/> REGIONS: CPT

## 2024-05-17 NOTE — FLOWSHEET NOTE
strengthening, flexibility, endurance, ROM performed to prevent loss of range of motion, maintain or improve muscular strength or increase flexibility, following either an injury or surgery.   (07849) THERAPEUTIC ACTIVITY - use of dynamic activities to improve functional performance. (Ex include squatting, ascending/descending stairs, walking, bending, lifting, catching, throwing, pushing, pulling, jumping.)  Direct, one on one contact, billed in 15-minute increments.  (24533) MANUAL THERAPY -  Manual therapy techniques, 1 or more regions, each 15 minutes (Mobilization/manipulation, manual lymphatic drainage, manual traction) for the purpose of modulating pain, promoting relaxation,  increasing ROM, reducing/eliminating soft tissue swelling/inflammation/restriction, improving soft tissue extensibility and allowing for proper ROM for normal function with self care, mobility, lifting and ambulation    GOALS     Patient stated goal: move pain free, or with less soreness   [] Progressing: [] Met: [] Not Met: [] Adjusted    Therapist goals for Patient:   Short Term Goals: To be achieved in: 2 weeks  1. Independent in HEP and progression per patient tolerance, in order to prevent re-injury.   [] Progressing: [] Met: [] Not Met: [] Adjusted  2. Patient will have a decrease in pain to <2/10 to facilitate improvement in movement, function, and ADLs as indicated by Functional Deficits.  [] Progressing: [] Met: [] Not Met: [] Adjusted    Long Term Goals: To be achieved in: 6 weeks or discharge   1. Disability index score of 20% or less for the [place] Back Pain Disability Scale to assist with reaching prior level of function with activities such as bending over and lifting heavy items .  [] Progressing: [] Met: [] Not Met: [] Adjusted  2. Patient will demonstrate increased AROM of lumbar spine to WNL without pain or end range deficits to allow for proper joint functioning to enable patient to resume all daily activities without

## 2024-05-22 ENCOUNTER — HOSPITAL ENCOUNTER (OUTPATIENT)
Dept: PHYSICAL THERAPY | Age: 74
Setting detail: THERAPIES SERIES
Discharge: HOME | End: 2024-05-22
Payer: MEDICARE

## 2024-05-22 PROCEDURE — 97140 MANUAL THERAPY 1/> REGIONS: CPT

## 2024-05-22 PROCEDURE — 97110 THERAPEUTIC EXERCISES: CPT

## 2024-05-22 PROCEDURE — 97530 THERAPEUTIC ACTIVITIES: CPT

## 2024-05-22 NOTE — DISCHARGE SUMMARY
needed that may include: Cryotherapy, Electrical Stimulation, Thermal Agents, and Traction  Patient education on postural re-education and progression of HEP    Plan: Discharge    Electronically Signed by Amador Albrecht PT , DPT Date: 05/22/2024     Note: Portions of this note have been templated and/or copied from initial evaluation, reassessments and prior notes for documentation efficiency.    Note: If patient does not return for scheduled/recommended follow up visits, this note will serve as a discharge from care along with the most recent update on progress.

## 2024-05-24 ENCOUNTER — APPOINTMENT (OUTPATIENT)
Dept: PHYSICAL THERAPY | Age: 74
End: 2024-05-24
Payer: MEDICARE

## 2024-05-24 DIAGNOSIS — E78.5 HYPERLIPIDEMIA, UNSPECIFIED HYPERLIPIDEMIA TYPE: ICD-10-CM

## 2024-05-24 RX ORDER — ATORVASTATIN CALCIUM 40 MG/1
40 TABLET, FILM COATED ORAL DAILY
Qty: 90 TABLET | Refills: 3 | OUTPATIENT
Start: 2024-05-24

## 2024-05-24 NOTE — TELEPHONE ENCOUNTER
From: Raul Warren  To: Office of Dr. Stephan Vásquez  Sent: 5/24/2024 2:38 PM EDT  Subject: Medication Renewal Request    Refills have been requested for the following medications:     atorvastatin (LIPITOR) 40 MG tablet [Dr. Stephan Vásquez MD]    Preferred pharmacy[place] [id number] [place] [address] - P [phone] - F [phone]   Simple: Patient demonstrates quick and easy understanding

## 2024-05-29 ENCOUNTER — APPOINTMENT (OUTPATIENT)
Dept: PHYSICAL THERAPY | Age: 74
End: 2024-05-29
Payer: MEDICARE

## 2024-05-31 ENCOUNTER — APPOINTMENT (OUTPATIENT)
Dept: PHYSICAL THERAPY | Age: 74
End: 2024-05-31
Payer: MEDICARE

## 2024-08-12 ENCOUNTER — TELEPHONE (OUTPATIENT)
Dept: FAMILY MEDICINE CLINIC | Age: 74
End: 2024-08-12

## 2024-08-12 NOTE — TELEPHONE ENCOUNTER
Patient was calling to be seen. He has been a little SOB here and there.  He is only having episodes of it.  He is also having some fatigue which is new.   Small headaches and Back pain which he isn't sure. He said he is not sure if he has Covid but knows that SOB is a symptom.       Can we please please give him a call

## 2024-08-13 ENCOUNTER — OFFICE VISIT (OUTPATIENT)
Dept: FAMILY MEDICINE CLINIC | Age: 74
End: 2024-08-13
Payer: MEDICARE

## 2024-08-13 VITALS
OXYGEN SATURATION: 98 % | DIASTOLIC BLOOD PRESSURE: 92 MMHG | HEART RATE: 74 BPM | BODY MASS INDEX: 32.48 KG/M2 | WEIGHT: 232 LBS | SYSTOLIC BLOOD PRESSURE: 140 MMHG | HEIGHT: 71 IN

## 2024-08-13 DIAGNOSIS — R07.9 INTERMITTENT CHEST PAIN: ICD-10-CM

## 2024-08-13 DIAGNOSIS — I10 HYPERTENSION, UNSPECIFIED TYPE: ICD-10-CM

## 2024-08-13 DIAGNOSIS — R06.02 SHORTNESS OF BREATH: ICD-10-CM

## 2024-08-13 DIAGNOSIS — R06.02 SHORTNESS OF BREATH: Primary | ICD-10-CM

## 2024-08-13 PROCEDURE — 3017F COLORECTAL CA SCREEN DOC REV: CPT | Performed by: REGISTERED NURSE

## 2024-08-13 PROCEDURE — 1123F ACP DISCUSS/DSCN MKR DOCD: CPT | Performed by: REGISTERED NURSE

## 2024-08-13 PROCEDURE — 4004F PT TOBACCO SCREEN RCVD TLK: CPT | Performed by: REGISTERED NURSE

## 2024-08-13 PROCEDURE — G8427 DOCREV CUR MEDS BY ELIG CLIN: HCPCS | Performed by: REGISTERED NURSE

## 2024-08-13 PROCEDURE — 3080F DIAST BP >= 90 MM HG: CPT | Performed by: REGISTERED NURSE

## 2024-08-13 PROCEDURE — 99215 OFFICE O/P EST HI 40 MIN: CPT | Performed by: REGISTERED NURSE

## 2024-08-13 PROCEDURE — G8417 CALC BMI ABV UP PARAM F/U: HCPCS | Performed by: REGISTERED NURSE

## 2024-08-13 PROCEDURE — 93000 ELECTROCARDIOGRAM COMPLETE: CPT | Performed by: REGISTERED NURSE

## 2024-08-13 PROCEDURE — 3077F SYST BP >= 140 MM HG: CPT | Performed by: REGISTERED NURSE

## 2024-08-13 RX ORDER — LISINOPRIL 5 MG/1
5 TABLET ORAL DAILY
Qty: 30 TABLET | Refills: 1 | Status: SHIPPED | OUTPATIENT
Start: 2024-08-13 | End: 2024-08-14

## 2024-08-13 NOTE — PROGRESS NOTES
BP treated: No      HDL Cholesterol: 55 mg/dL      Total Cholesterol: 136 mg/dL   Orders:    lisinopril (PRINIVIL;ZESTRIL) 5 MG tablet; Take 1 tablet by mouth daily      No follow-ups on file.       Subjective   HPI    On Friday, pt was taking granddaughter to [place], when walking from parking lot, he noticed severe SOB that spontaneously resolved once he stopped walking, was able to continue his time at the [employer] without issue.  Yesterday morning (812), he went to [facility] and came home and felt overly exhausted despite sleeping peacefully.  Pt reports no new difficulties with sleeping.  Pt swabbed himself for COVID-19 both Saturday and Monday, both times negative. Tmax 99.2.     It is worth noting that 2 weeks ago, pt noticed mild R anterior shin tenderness. This sensation has been mostly constant, movement does not make the pain worse. However, last night, he noticed that this tenderness radiated to the medial aspect of his RLE. No history of blood clots.     Pt also endorses some congestion in his chest, noticed it throughout the summer.    Pt has a BP monitor at home, his BP was 150's/80's which is new for pt although he had some elevated BP's that were documented back in 2023.   This morning, it was 150/90.  Right now, it's 140/90.   Pt is not currently on any antihypertensives.     Every once and a while,\" pt feels a \"bruising\" sensation to the L side of his chest.   Last time this happened was 3-4 weeks ago.  Pt denies having feelings of dizziness or light-headedness.   Nothing makes the pain worse or better.   Pt cannot relate the pain to any specific activity, states it does not get worse with exercise, it can happen randomly. The pain is not always relieved with rest. The pt denies being SOB during these episodes.     Pt had some light-headedness about 3 weeks ago when playing with his granddaughter, no syncopal episode. Pt had to stop playing with his granddaughter to recover.     Pt has not

## 2024-08-13 NOTE — PATIENT INSTRUCTIONS
even if current refills have been exhausted. If you are on a controlled medication you will be referred to a specialist (pain specialist, psychiatry, etc).   Forms: There is a $35 fee to fill out FMLA/Disability paperwork, payable at time of . Instead of the fee, you can choose to have the paperwork filled out during a separate office visit that is for filling out the paperwork only.  Medication Samples:  This office does not carry medication samples.  If you need assistance in getting your medications, then please let the medical assistant know so they can help you sign up for a drug assistance program that can help get medications at a reduced cost or even free (if you qualify).  Workman's Comp Claims: We do not handle workman's comp cases or claims. You will need to go to an urgent care to be seen or to whomever your employer uses.  General - Any abusive/rude behavior toward staff/providers may be cause for dismissal.      WE NOW OFFER [employer] SELF-SCHEDULING   IN 3 EASY STEPS    SCHEDULE AN APPT AT YOUR CONVENIENCE WITH NO HOLD/WAIT TIME  IN THE [facility] SELECT 'SCHEDULE AN APPOINTMENT' FROM THE MENU  CHOOSE DATE/TIME THAT WORKS FOR YOU    If you don't find an appointment time that works for your schedule, you can also submit an appointment request thru [employer].    CONVENIENT QUALITY CARE AT YOUR FINGERTIPS    NOT ON [facility]?  PLEASE ASK ANY STAFF MEMBER

## 2024-08-14 ENCOUNTER — APPOINTMENT (OUTPATIENT)
Dept: CT IMAGING | Age: 74
DRG: 164 | End: 2024-08-14
Payer: MEDICARE

## 2024-08-14 ENCOUNTER — TELEPHONE (OUTPATIENT)
Dept: FAMILY MEDICINE CLINIC | Age: 74
End: 2024-08-14

## 2024-08-14 ENCOUNTER — APPOINTMENT (OUTPATIENT)
Dept: GENERAL RADIOLOGY | Age: 74
DRG: 164 | End: 2024-08-14
Payer: MEDICARE

## 2024-08-14 ENCOUNTER — APPOINTMENT (OUTPATIENT)
Dept: VASCULAR LAB | Age: 74
DRG: 164 | End: 2024-08-14
Attending: INTERNAL MEDICINE
Payer: MEDICARE

## 2024-08-14 ENCOUNTER — APPOINTMENT (OUTPATIENT)
Age: 74
DRG: 164 | End: 2024-08-14
Payer: MEDICARE

## 2024-08-14 ENCOUNTER — HOSPITAL ENCOUNTER (INPATIENT)
Age: 74
LOS: 2 days | Discharge: HOME | DRG: 164 | End: 2024-08-16
Attending: EMERGENCY MEDICINE | Admitting: INTERNAL MEDICINE
Payer: MEDICARE

## 2024-08-14 DIAGNOSIS — R79.89 ELEVATED TROPONIN: ICD-10-CM

## 2024-08-14 DIAGNOSIS — I26.02 ACUTE SADDLE PULMONARY EMBOLISM WITH ACUTE COR PULMONALE (HCC): ICD-10-CM

## 2024-08-14 DIAGNOSIS — I26.99 PULMONARY EMBOLISM WITHOUT ACUTE COR PULMONALE (HCC): ICD-10-CM

## 2024-08-14 DIAGNOSIS — R06.02 SHORTNESS OF BREATH: Primary | ICD-10-CM

## 2024-08-14 DIAGNOSIS — I26.92 ACUTE SADDLE PULMONARY EMBOLISM WITHOUT ACUTE COR PULMONALE (HCC): ICD-10-CM

## 2024-08-14 DIAGNOSIS — J96.01 ACUTE RESPIRATORY FAILURE WITH HYPOXIA (HCC): ICD-10-CM

## 2024-08-14 LAB
ALBUMIN SERPL-MCNC: 3.7 G/DL (ref 3.4–5)
ALBUMIN/GLOB SERPL: 1.1 {RATIO} (ref 1.1–2.2)
ALP SERPL-CCNC: 67 U/L (ref 40–129)
ALT SERPL-CCNC: 22 U/L (ref 10–40)
ANION GAP SERPL CALCULATED.3IONS-SCNC: 15 MMOL/L (ref 3–16)
ANTI-XA UNFRAC HEPARIN: 0.87 IU/ML (ref 0.3–0.7)
ANTI-XA UNFRAC HEPARIN: <0.1 IU/ML (ref 0.3–0.7)
APTT BLD: 24.9 SEC (ref 22.1–36.4)
APTT BLD: 24.9 SEC (ref 22.1–36.4)
AST SERPL-CCNC: 20 U/L (ref 15–37)
BASOPHILS # BLD: 0 K/UL (ref 0–0.2)
BASOPHILS NFR BLD: 0.3 %
BILIRUB SERPL-MCNC: 0.7 MG/DL (ref 0–1)
BUN SERPL-MCNC: 11 MG/DL (ref 7–20)
CALCIUM SERPL-MCNC: 8.8 MG/DL (ref 8.3–10.6)
CHLORIDE SERPL-SCNC: 98 MMOL/L (ref 99–110)
CO2 SERPL-SCNC: 20 MMOL/L (ref 21–32)
CREAT SERPL-MCNC: 1 MG/DL (ref 0.8–1.3)
D-DIMER QUANTITATIVE: 15.86 UG/ML FEU (ref 0–0.6)
DEPRECATED RDW RBC AUTO: 13.2 % (ref 12.4–15.4)
ECHO AO ARCH DIAM: 2.4 CM
ECHO AO ASC DIAM: 3.6 CM
ECHO AO ASCENDING AORTA INDEX: 1.61 CM/M2
ECHO AO ROOT DIAM: 3.6 CM
ECHO AO ROOT INDEX: 1.61 CM/M2
ECHO AV AREA PEAK VELOCITY: 2.8 CM2
ECHO AV AREA VTI: 3 CM2
ECHO AV AREA/BSA PEAK VELOCITY: 1.3 CM2/M2
ECHO AV AREA/BSA VTI: 1.3 CM2/M2
ECHO AV MEAN GRADIENT: 3 MMHG
ECHO AV MEAN VELOCITY: 0.8 M/S
ECHO AV PEAK GRADIENT: 5 MMHG
ECHO AV PEAK VELOCITY: 1.2 M/S
ECHO AV VELOCITY RATIO: 0.83
ECHO AV VTI: 19.6 CM
ECHO BSA: 2.29 M2
ECHO EST RA PRESSURE: 3 MMHG
ECHO LA AREA 2C: 11.2 CM2
ECHO LA AREA 4C: 12.3 CM2
ECHO LA DIAMETER INDEX: 1.92 CM/M2
ECHO LA DIAMETER: 4.3 CM
ECHO LA MAJOR AXIS: 5.1 CM
ECHO LA MINOR AXIS: 5.4 CM
ECHO LA TO AORTIC ROOT RATIO: 1.19
ECHO LA VOL BP: 21 ML (ref 18–58)
ECHO LA VOL MOD A2C: 19 ML (ref 18–58)
ECHO LA VOL MOD A4C: 23 ML (ref 18–58)
ECHO LA VOL/BSA BIPLANE: 9 ML/M2 (ref 16–34)
ECHO LA VOLUME INDEX MOD A2C: 8 ML/M2 (ref 16–34)
ECHO LA VOLUME INDEX MOD A4C: 10 ML/M2 (ref 16–34)
ECHO LV E' LATERAL VELOCITY: 5 CM/S
ECHO LV E' SEPTAL VELOCITY: 5 CM/S
ECHO LV EDV A2C: 68 ML
ECHO LV EDV A4C: 66 ML
ECHO LV EDV INDEX A4C: 29 ML/M2
ECHO LV EDV NDEX A2C: 30 ML/M2
ECHO LV EJECTION FRACTION A2C: 76 %
ECHO LV EJECTION FRACTION A4C: 71 %
ECHO LV EJECTION FRACTION BIPLANE: 74 % (ref 55–100)
ECHO LV ESV A2C: 17 ML
ECHO LV ESV A4C: 19 ML
ECHO LV ESV INDEX A2C: 8 ML/M2
ECHO LV ESV INDEX A4C: 8 ML/M2
ECHO LV FRACTIONAL SHORTENING: 38 % (ref 28–44)
ECHO LV INTERNAL DIMENSION DIASTOLE INDEX: 1.52 CM/M2
ECHO LV INTERNAL DIMENSION DIASTOLIC: 3.4 CM (ref 4.2–5.9)
ECHO LV INTERNAL DIMENSION SYSTOLIC INDEX: 0.94 CM/M2
ECHO LV INTERNAL DIMENSION SYSTOLIC: 2.1 CM
ECHO LV IVSD: 1.5 CM (ref 0.6–1)
ECHO LV MASS 2D: 156.7 G (ref 88–224)
ECHO LV MASS INDEX 2D: 70 G/M2 (ref 49–115)
ECHO LV POSTERIOR WALL DIASTOLIC: 1.2 CM (ref 0.6–1)
ECHO LV RELATIVE WALL THICKNESS RATIO: 0.71
ECHO LVOT AREA: 3.1 CM2
ECHO LVOT AV VTI INDEX: 0.95
ECHO LVOT DIAM: 2 CM
ECHO LVOT MEAN GRADIENT: 2 MMHG
ECHO LVOT PEAK GRADIENT: 4 MMHG
ECHO LVOT PEAK VELOCITY: 1 M/S
ECHO LVOT STROKE VOLUME INDEX: 26.2 ML/M2
ECHO LVOT SV: 58.7 ML
ECHO LVOT VTI: 18.7 CM
ECHO MV A VELOCITY: 1.16 M/S
ECHO MV E VELOCITY: 0.5 M/S
ECHO MV E/A RATIO: 0.43
ECHO MV E/E' LATERAL: 10
ECHO MV E/E' RATIO (AVERAGED): 10
ECHO MV E/E' SEPTAL: 10
ECHO RA AREA 4C: 21.9 CM2
ECHO RA END SYSTOLIC VOLUME APICAL 4 CHAMBER INDEX BSA: 33 ML/M2
ECHO RA VOLUME: 74 ML
ECHO RIGHT VENTRICULAR SYSTOLIC PRESSURE (RVSP): 40 MMHG
ECHO RV BASAL DIMENSION: 5.1 CM
ECHO RV FRACTIONAL AREA CHANGE: 16 %
ECHO RV FREE WALL PEAK S': 10 CM/S
ECHO RV LONGITUDINAL DIMENSION: 6.8 CM
ECHO RV MID DIMENSION: 3.3 CM
ECHO RV TAPSE: 1.6 CM (ref 1.7–?)
ECHO TV REGURGITANT MAX VELOCITY: 3.05 M/S
ECHO TV REGURGITANT PEAK GRADIENT: 37 MMHG
EOSINOPHIL # BLD: 0.1 K/UL (ref 0–0.6)
EOSINOPHIL NFR BLD: 0.9 %
FLUAV RNA RESP QL NAA+PROBE: NOT DETECTED
FLUBV RNA RESP QL NAA+PROBE: NOT DETECTED
GFR SERPLBLD CREATININE-BSD FMLA CKD-EPI: 79 ML/MIN/{1.73_M2}
GLUCOSE SERPL-MCNC: 178 MG/DL (ref 70–99)
HCT VFR BLD AUTO: 43.4 % (ref 40.5–52.5)
HGB BLD-MCNC: 14.6 G/DL (ref 13.5–17.5)
INR PPP: 1.09 (ref 0.85–1.15)
LACTATE BLDV-SCNC: 1.9 MMOL/L (ref 0.4–1.9)
LACTATE BLDV-SCNC: 2.4 MMOL/L (ref 0.4–1.9)
LYMPHOCYTES # BLD: 1.3 K/UL (ref 1–5.1)
LYMPHOCYTES NFR BLD: 9 %
MCH RBC QN AUTO: 32.2 PG (ref 26–34)
MCHC RBC AUTO-ENTMCNC: 33.5 G/DL (ref 31–36)
MCV RBC AUTO: 96.1 FL (ref 80–100)
MONOCYTES # BLD: 1.1 K/UL (ref 0–1.3)
MONOCYTES NFR BLD: 8.2 %
NEUTROPHILS # BLD: 11.3 K/UL (ref 1.7–7.7)
NEUTROPHILS NFR BLD: 81.6 %
NT-PROBNP SERPL-MCNC: 185 PG/ML (ref 0–124)
PLATELET # BLD AUTO: 206 K/UL (ref 135–450)
PMV BLD AUTO: 8.1 FL (ref 5–10.5)
POTASSIUM SERPL-SCNC: 4.2 MMOL/L (ref 3.5–5.1)
PROT SERPL-MCNC: 7.1 G/DL (ref 6.4–8.2)
PROTHROMBIN TIME: 14.3 SEC (ref 11.9–14.9)
RBC # BLD AUTO: 4.52 M/UL (ref 4.2–5.9)
SARS-COV-2 RNA RESP QL NAA+PROBE: NOT DETECTED
SODIUM SERPL-SCNC: 133 MMOL/L (ref 136–145)
TROPONIN, HIGH SENSITIVITY: 250 NG/L (ref 0–22)
TROPONIN, HIGH SENSITIVITY: 352 NG/L (ref 0–22)
WBC # BLD AUTO: 13.9 K/UL (ref 4–11)

## 2024-08-14 PROCEDURE — 2000000000 HC ICU R&B

## 2024-08-14 PROCEDURE — 85610 PROTHROMBIN TIME: CPT

## 2024-08-14 PROCEDURE — 87040 BLOOD CULTURE FOR BACTERIA: CPT

## 2024-08-14 PROCEDURE — APPSS60 APP SPLIT SHARED TIME 46-60 MINUTES: Performed by: NURSE PRACTITIONER

## 2024-08-14 PROCEDURE — 6360000004 HC RX CONTRAST MEDICATION: Performed by: INTERNAL MEDICINE

## 2024-08-14 PROCEDURE — 99285 EMERGENCY DEPT VISIT HI MDM: CPT

## 2024-08-14 PROCEDURE — 2580000003 HC RX 258: Performed by: EMERGENCY MEDICINE

## 2024-08-14 PROCEDURE — 87636 SARSCOV2 & INF A&B AMP PRB: CPT

## 2024-08-14 PROCEDURE — 71260 CT THORAX DX C+: CPT

## 2024-08-14 PROCEDURE — 85379 FIBRIN DEGRADATION QUANT: CPT

## 2024-08-14 PROCEDURE — 6360000002 HC RX W HCPCS: Performed by: EMERGENCY MEDICINE

## 2024-08-14 PROCEDURE — 74176 CT ABD & PELVIS W/O CONTRAST: CPT

## 2024-08-14 PROCEDURE — 83605 ASSAY OF LACTIC ACID: CPT

## 2024-08-14 PROCEDURE — APPNB30 APP NON BILLABLE TIME 0-30 MINS: Performed by: NURSE PRACTITIONER

## 2024-08-14 PROCEDURE — 85520 HEPARIN ASSAY: CPT

## 2024-08-14 PROCEDURE — 2580000003 HC RX 258: Performed by: INTERNAL MEDICINE

## 2024-08-14 PROCEDURE — 83880 ASSAY OF NATRIURETIC PEPTIDE: CPT

## 2024-08-14 PROCEDURE — 85025 COMPLETE CBC W/AUTO DIFF WBC: CPT

## 2024-08-14 PROCEDURE — 2700000000 HC OXYGEN THERAPY PER DAY

## 2024-08-14 PROCEDURE — 36415 COLL VENOUS BLD VENIPUNCTURE: CPT

## 2024-08-14 PROCEDURE — 93005 ELECTROCARDIOGRAM TRACING: CPT | Performed by: EMERGENCY MEDICINE

## 2024-08-14 PROCEDURE — C8929 TTE W OR WO FOL WCON,DOPPLER: HCPCS

## 2024-08-14 PROCEDURE — 93306 TTE W/DOPPLER COMPLETE: CPT | Performed by: INTERNAL MEDICINE

## 2024-08-14 PROCEDURE — 99223 1ST HOSP IP/OBS HIGH 75: CPT | Performed by: SURGERY

## 2024-08-14 PROCEDURE — 71045 X-RAY EXAM CHEST 1 VIEW: CPT

## 2024-08-14 PROCEDURE — 84484 ASSAY OF TROPONIN QUANT: CPT

## 2024-08-14 PROCEDURE — 80053 COMPREHEN METABOLIC PANEL: CPT

## 2024-08-14 PROCEDURE — 6370000000 HC RX 637 (ALT 250 FOR IP): Performed by: INTERNAL MEDICINE

## 2024-08-14 PROCEDURE — 85730 THROMBOPLASTIN TIME PARTIAL: CPT

## 2024-08-14 PROCEDURE — 6360000004 HC RX CONTRAST MEDICATION: Performed by: EMERGENCY MEDICINE

## 2024-08-14 PROCEDURE — 96374 THER/PROPH/DIAG INJ IV PUSH: CPT

## 2024-08-14 RX ORDER — HEPARIN SODIUM 1000 [USP'U]/ML
40 INJECTION, SOLUTION INTRAVENOUS; SUBCUTANEOUS PRN
Status: DISCONTINUED | OUTPATIENT
Start: 2024-08-14 | End: 2024-08-16

## 2024-08-14 RX ORDER — ACETAMINOPHEN 650 MG/1
650 SUPPOSITORY RECTAL EVERY 6 HOURS PRN
Status: DISCONTINUED | OUTPATIENT
Start: 2024-08-14 | End: 2024-08-16 | Stop reason: HOSPADM

## 2024-08-14 RX ORDER — ACETAMINOPHEN 325 MG/1
650 TABLET ORAL EVERY 6 HOURS PRN
Status: DISCONTINUED | OUTPATIENT
Start: 2024-08-14 | End: 2024-08-15

## 2024-08-14 RX ORDER — SODIUM CHLORIDE, SODIUM LACTATE, POTASSIUM CHLORIDE, AND CALCIUM CHLORIDE .6; .31; .03; .02 G/100ML; G/100ML; G/100ML; G/100ML
1000 INJECTION, SOLUTION INTRAVENOUS ONCE
Status: COMPLETED | OUTPATIENT
Start: 2024-08-14 | End: 2024-08-14

## 2024-08-14 RX ORDER — HEPARIN SODIUM 1000 [USP'U]/ML
80 INJECTION, SOLUTION INTRAVENOUS; SUBCUTANEOUS ONCE
Status: COMPLETED | OUTPATIENT
Start: 2024-08-14 | End: 2024-08-14

## 2024-08-14 RX ORDER — HEPARIN SODIUM 10000 [USP'U]/100ML
5-30 INJECTION, SOLUTION INTRAVENOUS CONTINUOUS
Status: DISCONTINUED | OUTPATIENT
Start: 2024-08-14 | End: 2024-08-16

## 2024-08-14 RX ORDER — SODIUM CHLORIDE 0.9 % (FLUSH) 0.9 %
5-40 SYRINGE (ML) INJECTION EVERY 12 HOURS SCHEDULED
Status: DISCONTINUED | OUTPATIENT
Start: 2024-08-14 | End: 2024-08-16 | Stop reason: HOSPADM

## 2024-08-14 RX ORDER — SODIUM CHLORIDE 9 MG/ML
INJECTION, SOLUTION INTRAVENOUS PRN
Status: DISCONTINUED | OUTPATIENT
Start: 2024-08-14 | End: 2024-08-16 | Stop reason: HOSPADM

## 2024-08-14 RX ORDER — HEPARIN SODIUM 1000 [USP'U]/ML
80 INJECTION, SOLUTION INTRAVENOUS; SUBCUTANEOUS PRN
Status: DISCONTINUED | OUTPATIENT
Start: 2024-08-14 | End: 2024-08-16

## 2024-08-14 RX ORDER — LISINOPRIL 5 MG/1
5 TABLET ORAL DAILY
Qty: 90 TABLET | Refills: 0 | Status: SHIPPED | OUTPATIENT
Start: 2024-08-14

## 2024-08-14 RX ORDER — CALCIUM CARBONATE 500 MG/1
1 TABLET, CHEWABLE ORAL AS NEEDED
COMMUNITY

## 2024-08-14 RX ORDER — POLYETHYLENE GLYCOL 3350 17 G/17G
17 POWDER, FOR SOLUTION ORAL DAILY PRN
Status: DISCONTINUED | OUTPATIENT
Start: 2024-08-14 | End: 2024-08-16 | Stop reason: HOSPADM

## 2024-08-14 RX ORDER — SODIUM CHLORIDE 0.9 % (FLUSH) 0.9 %
5-40 SYRINGE (ML) INJECTION PRN
Status: DISCONTINUED | OUTPATIENT
Start: 2024-08-14 | End: 2024-08-16 | Stop reason: HOSPADM

## 2024-08-14 RX ORDER — ATORVASTATIN CALCIUM 40 MG/1
40 TABLET, FILM COATED ORAL NIGHTLY
Status: DISCONTINUED | OUTPATIENT
Start: 2024-08-14 | End: 2024-08-16 | Stop reason: HOSPADM

## 2024-08-14 RX ADMIN — SODIUM CHLORIDE, POTASSIUM CHLORIDE, SODIUM LACTATE AND CALCIUM CHLORIDE 1000 ML: 600; 310; 30; 20 INJECTION, SOLUTION INTRAVENOUS at 11:30

## 2024-08-14 RX ADMIN — ATORVASTATIN CALCIUM 40 MG: 40 TABLET, FILM COATED ORAL at 20:41

## 2024-08-14 RX ADMIN — IOPAMIDOL 75 ML: 755 INJECTION, SOLUTION INTRAVENOUS at 12:30

## 2024-08-14 RX ADMIN — Medication 10 ML: at 14:14

## 2024-08-14 RX ADMIN — HEPARIN SODIUM 8300 UNITS: 1000 INJECTION INTRAVENOUS; SUBCUTANEOUS at 12:49

## 2024-08-14 RX ADMIN — HEPARIN SODIUM 18 UNITS/KG/HR: 10000 INJECTION, SOLUTION INTRAVENOUS at 12:52

## 2024-08-14 RX ADMIN — SODIUM CHLORIDE, PRESERVATIVE FREE 10 ML: 5 INJECTION INTRAVENOUS at 20:42

## 2024-08-14 RX ADMIN — POLYVINYL ALCOHOL, POVIDONE 1 DROP: 14; 6 SOLUTION/ DROPS OPHTHALMIC at 16:17

## 2024-08-14 ASSESSMENT — PAIN DESCRIPTION - LOCATION: LOCATION: LEG

## 2024-08-14 ASSESSMENT — PAIN DESCRIPTION - ORIENTATION: ORIENTATION: RIGHT

## 2024-08-14 ASSESSMENT — PAIN - FUNCTIONAL ASSESSMENT: PAIN_FUNCTIONAL_ASSESSMENT: 0-10

## 2024-08-14 ASSESSMENT — PAIN SCALES - GENERAL: PAINLEVEL_OUTOF10: 5

## 2024-08-14 ASSESSMENT — LIFESTYLE VARIABLES: HOW OFTEN DO YOU HAVE A DRINK CONTAINING ALCOHOL: 2-3 TIMES A WEEK

## 2024-08-14 NOTE — ED NOTES
Pharmacy Home Medication Reconciliation Note    A medication reconciliation has been completed for Raul Warren 1950    Pharmacy: [facility]  Information provided by: patient, fill history    The patient's home medication list is as follows:  No current facility-administered medications on file prior to encounter.     Current Outpatient Medications on File Prior to Encounter   Medication Sig Dispense Refill    calcium carbonate (TUMS) 500 MG chewable tablet Take 1 tablet by mouth as needed for Heartburn      atorvastatin (LIPITOR) 40 MG tablet Take 1 tablet by mouth daily (Patient taking differently: Take 1 tablet by mouth at bedtime) 90 tablet 3    RESTASIS 0.05 % ophthalmic emulsion Place 1 drop into both eyes daily  1    lisinopril (PRINIVIL;ZESTRIL) 5 MG tablet TAKE 1 TABLET BY MOUTH DAILY 90 tablet 0    [DISCONTINUED] lisinopril (PRINIVIL;ZESTRIL) 5 MG tablet Take 1 tablet by mouth daily 30 tablet 1       Of note, lisinopril is a new prescription and has not yet picked up from the pharmacy.    Timing of last doses updated.    Thank you,  Elysia Abbott, PharmD, BCCCP

## 2024-08-14 NOTE — ED TRIAGE NOTES
Pt arrived via [facility] from home. SOB, chest pain, diaphoretic, passed out on couch, cough with congestion, right leg pain. Went to PCP for Chest pain and SOB.  Scheduled for ECHO and stress test. 89% on RA and 99% on 3l/nc.    Patient alert and oriented x4.  GCS 15/15.  Skin appropriate for ethnicity, dry and intact.  No signs of acute distress noted at this time. Regular respiratory pattern, normal respiratory depth, unlabored respirations.   Right leg pain.  5/10 pain.  Cardiac Rhythm NSR.      Patient placed on a continuous pulse oximetry and telemetry monitoring. Bedside Monitor on with Alarms audible and alarms set.  Patient on cycling blood pressure.     Patient oriented to room and ED throughput process.  Patient educated on all orders, including any medications.  Patient educated on chief complaint/symptoms. Patient encouraged to ask questions regarding care, medications or treatment plan.  Patient aware of how to reach staff with questions/concerns.  Safety measures and Fall risk precautions in place, ED bed locked in lowest position, bed side table within reach, and call light in reach.  Plan of care ongoing.  Patient expresses no other needs at this time.     Warm blanket provided to pt.

## 2024-08-14 NOTE — TELEPHONE ENCOUNTER
SPOKE TO PT AND WIFE. PT IS IN THE EMERGENCY ROOM AFTER A SYNCOPAL EPISODE AT HOME THIS MORNING. HE IS EXPERIENCING SOB, CP, RIGHT LEG PAIN, DIAPHORETIC WITH COUGH AND CONGESTION. PT'S OXYGEN WAS 89% THIS MORNING AND HE WAS PLACED ON 3L OF NC OXYGEN. HE DID SCHEDULE HIS STRESS TEST AND ECHO BUT THEY DID NOT SCHEDULE HIM UNTIL 9/6/2024. I ASKED HIM TO CALL ME WHEN DISCHARGED, IF HE HASN'T HAD HIS STRESS TEST YET, WE WILL CALL AND GET HIS STRESS TEST MOVED UP RIGHT AWAY. PT AGREED WITH THE PLAN. I DISCUSSED THIS WITH STEVEN FUNK CNP, SHE AGREED WITH THE PLAN. PT'S D-DIMER, BNP AND TROPONIN ARE BACK AND ALL ELEVATED. I INFORMED STEVEN FUNK CNP.  [facility]

## 2024-08-14 NOTE — TELEPHONE ENCOUNTER
Patient was calling to speak with Giuliana. We told him to call Aida weiner to schedule testing with Cardiology. Giuliana told him to call if they can't get him in soon enough. His Stress tests are scheduled  9/6/24 and the Echo is on 10/4/24.    Can we please give him a call

## 2024-08-14 NOTE — H&P
HOSPITALISTS HISTORY AND PHYSICAL    8/14/2024 2:18 PM    Patient Information:  DEMETRIUS WARREN is a 74 y.o. male [id number]  PCP:  Stephan Vásquez MD (Tel: [phone] )    Chief complaint:    Chief Complaint   Patient presents with    Chest Pain     Pt arrived via [facility] from home. SOB, chest pain, diaphoretic, passed out on couch, cough with congestion, right leg pain. Went to PCP for Chest pain and SOB.  Scheduled for ECHO and stress test.        History of Present Illness:  Demetrius Warren is a 74 y.o. male having shortness of breath since Thursday that is worse with exertion no chest pain today patient felt like he was about to pass out and was diaphoretic thus came to the ED patient has recently traveled on a long car drive to [place] around July 13 to 20 and roughly 2 weeks ago noticed pain in his right calf.  His brother does have a history of blood clots patient had CT PE in the ED which showed saddle pulm embolism patient is doing well on room air with slightly tachycardic in the 100s.  Patient does not smoke does drink 3-4 times a week      REVIEW OF SYSTEMS:   Constitutional: Negative for fever,chills or night sweats  ENT: Negative for rhinorrhea, epistaxis, hoarseness, sore throat.  Gastrointestinal: Negative for nausea, vomiting, diarrhea  Genitourinary: Negative for polyuria, dysuria   Hematologic/Lymphatic: Negative for bleeding tendency, easy bruising  Musculoskeletal: Negative for myalgias and arthralgias  Neurologic: Negative for confusion,dysarthria.  Skin: Negative for itching,rash  Psychiatric: Negative for depression,anxiety, agitation.  Endocrine: Negative for polydipsia,polyuria,heat /cold intolerance.    Past Medical History:   has a past medical history of Actinic keratosis, BPH (benign prostatic hypertrophy), BPH w/o urinary obs/LUTS, Bulging disc, Chest pain, Contusion of back,  Costochondritis, Degeneration of intervertebral disc, site unspecified, Diarrhea, Dysphagia, unspecified, Eosinophilic esophagitis, HCV (hepatitis C virus), Hyperpotassemia, Lumbago, Lumbar strain, Other malignant neoplasm of skin, site unspecified, Pain in joint, shoulder region, Partial Achilles tendon tear, Pharyngitis, Schatzki's ring, Screening colonoscopy, and Stricture and stenosis of esophagus.     Past Surgical History:   has a past surgical history that includes Appendectomy (02/2000); Knee arthroscopy (1/2000); External ear surgery (1/13); Upper gastrointestinal endoscopy ([id number]); Colonoscopy (2012); and TURP (1/25/16).     Medications:  No current facility-administered medications on file prior to encounter.     Current Outpatient Medications on File Prior to Encounter   Medication Sig Dispense Refill    calcium carbonate (TUMS) 500 MG chewable tablet Take 1 tablet by mouth as needed for Heartburn      atorvastatin (LIPITOR) 40 MG tablet Take 1 tablet by mouth daily (Patient taking differently: Take 1 tablet by mouth at bedtime) 90 tablet 3    RESTASIS 0.05 % ophthalmic emulsion Place 1 drop into both eyes daily  1    lisinopril (PRINIVIL;ZESTRIL) 5 MG tablet TAKE 1 TABLET BY MOUTH DAILY 90 tablet 0       Allergies:  No Known Allergies     Social History:  Patient Lives at home   reports that he has been smoking cigarettes. He started smoking about 53 years ago. He has a 19 pack-year smoking history. He has never used smokeless tobacco. He reports current alcohol use of about 1.0 standard drink of alcohol per week. He reports that he does not use drugs.     Family History:  family history includes Alcohol Abuse in his father; Cancer in his father, maternal grandfather, and paternal grandfather; Depression in his mother; Heart Disease in his maternal grandfather and maternal grandmother; High Blood Pressure in his brother; Other in his father and mother; Other (age of onset: 50) in his brother;

## 2024-08-14 NOTE — PROGRESS NOTES
Advanced Care Planning Note.    Purpose of Encounter: Advanced care planning in light of hospitalization  Parties In Attendance: Patient,  daugther  Decisional Capacity: Yes  Subjective: Patient  understand that this conversation is to address long term care goal  Objective: Patient  to the hospital with acute saddle pulmonary embolism  Goals of Care Determination: Patient would pursue CPR and Intubation if required.   Code Status: full code  Time spent on Advanced care Plannin minutes  Advanced Care Planning Documents: documented patient's wishes, would like Lucy Warren  to make medical decisions if unable to make decisions

## 2024-08-14 NOTE — ED PROVIDER NOTES
evaluation, this patient had a high probability of imminent or life-threatening deterioration due to acute hypoxic respiratory failure, acute saddle pulmonary embolism which required my direct attention, intervention, and personal management.    The total critical care time personally spent while evaluating and treating this patient was 35 minutes exclusive of any time spent doing separately billable procedures.  This includes time at the bedside, data interpretation, medication management, monitoring for potential decompensation and physician consultation.  Specifics of interventions taken and potentially life-threatening diagnostic considerations are listed above in the medical decision making.      Final Impression  1. Shortness of breath    2. Acute respiratory failure with hypoxia (HCC)    3. Acute saddle pulmonary embolism with acute cor pulmonale (HCC)    4. Elevated troponin        Blood pressure 129/88, pulse (!) 102, temperature 96.9 °F (36.1 °C), temperature source Oral, resp. rate 15, height 1.803 m (5' 11\"), weight 104.3 kg (230 lb), SpO2 97%.     Disposition:  DISPOSITION Decision To Admit 08/14/2024 01:10:22 PM      Patient Referrals:  No follow-up provider specified.    Discharge Medications:  New Prescriptions    No medications on file       Discontinued Medications:  Discontinued Medications    No medications on file       This chart was generated using the Dragon dictation system. I created this record but it may contain dictation errors given the limitations of this technology.    Asad Ireland DO (electronically signed)  Attending Emergency Physician       Asad Ireland DO  08/14/24 [id number]

## 2024-08-14 NOTE — CONSULTS
[facility]  Consultation Note    Chief Complaint / Reason for Consultation  Saddle PE    History of Present Illness  Patient is a 74 y.o. male with past medical history of HLD who presented to the ED with complaints of SOB and syncopal episode this morning.  Patient reports for the past week he has been having SOB.  Today he had episode where he passed out and woke up diaphoretic.  He denies any chest pain.  He reports about 2 weeks ago he noticed pain in his right leg.  He traveled by car to [place] July 13-20 th.  He denies any recent surgery.  No previous history of blood clots.  He reports his brother has a clotting disorder.  Further workup in ED included CTPE chest which showed saddle PE.  Patient was started on heparin drip.  Hemodynamically stable on 3 lpm nasal cannula.  We have been consulted for further evaluation and recommendations.     Review of Systems   + SOB  Denies fevers, chills, chest pain, nausea, vomiting, hematemesis, diarrhea, constipation, melena, hematochezia, wt changes, vision problems, blindness, hearing problems, facial droop, slurred speech, extremity weakness, extremity numbness, dysuria.    Past Medical History:   Diagnosis Date    Actinic keratosis     BPH (benign prostatic hypertrophy)     BPH w/o urinary obs/LUTS     Bulging disc 1990s    lumbar    Chest pain     Contusion of back     Costochondritis 9/7/2011    Degeneration of intervertebral disc, site unspecified     Diarrhea 9/7/2011    Dysphagia, unspecified     Eosinophilic esophagitis [id number]    DR WILLIE MCCRARY, EGD    HCV (hepatitis C virus) 12/1997    Hyperpotassemia     Lumbago     Lumbar strain     acute     Other malignant neoplasm of skin, site unspecified     Pain in joint, shoulder region     Partial Achilles tendon tear     LT     Pharyngitis 9/7/2011    Schatzki's ring [phone]    DR WILLIE MCCRARY, EGD    Screening colonoscopy     colonoscopy (benign polyps) every 5 years 3/2007

## 2024-08-15 ENCOUNTER — APPOINTMENT (OUTPATIENT)
Dept: VASCULAR LAB | Age: 74
DRG: 164 | End: 2024-08-15
Attending: INTERNAL MEDICINE
Payer: MEDICARE

## 2024-08-15 PROBLEM — I26.99 PULMONARY EMBOLISM WITHOUT ACUTE COR PULMONALE (HCC): Status: ACTIVE | Noted: 2024-08-14

## 2024-08-15 LAB
ANION GAP SERPL CALCULATED.3IONS-SCNC: 11 MMOL/L (ref 3–16)
ANTI-XA UNFRAC HEPARIN: 0.32 IU/ML (ref 0.3–0.7)
ANTI-XA UNFRAC HEPARIN: 0.62 IU/ML (ref 0.3–0.7)
ANTI-XA UNFRAC HEPARIN: 0.73 IU/ML (ref 0.3–0.7)
BASOPHILS # BLD: 0.1 K/UL (ref 0–0.2)
BASOPHILS NFR BLD: 0.7 %
BUN SERPL-MCNC: 12 MG/DL (ref 7–20)
CALCIUM SERPL-MCNC: 8.7 MG/DL (ref 8.3–10.6)
CHLORIDE SERPL-SCNC: 101 MMOL/L (ref 99–110)
CO2 SERPL-SCNC: 22 MMOL/L (ref 21–32)
CREAT SERPL-MCNC: 1.1 MG/DL (ref 0.8–1.3)
DEPRECATED RDW RBC AUTO: 13.1 % (ref 12.4–15.4)
ECHO BSA: 2.29 M2
ECHO BSA: 2.29 M2
EKG ATRIAL RATE: 99 BPM
EKG DIAGNOSIS: NORMAL
EKG P AXIS: 70 DEGREES
EKG P-R INTERVAL: 146 MS
EKG Q-T INTERVAL: 362 MS
EKG QRS DURATION: 90 MS
EKG QTC CALCULATION (BAZETT): 464 MS
EKG R AXIS: 58 DEGREES
EKG T AXIS: 70 DEGREES
EKG VENTRICULAR RATE: 99 BPM
EOSINOPHIL # BLD: 0.1 K/UL (ref 0–0.6)
EOSINOPHIL NFR BLD: 1.3 %
GFR SERPLBLD CREATININE-BSD FMLA CKD-EPI: 71 ML/MIN/{1.73_M2}
GLUCOSE SERPL-MCNC: 141 MG/DL (ref 70–99)
HCT VFR BLD AUTO: 41.5 % (ref 40.5–52.5)
HGB BLD-MCNC: 14.3 G/DL (ref 13.5–17.5)
LYMPHOCYTES # BLD: 1.8 K/UL (ref 1–5.1)
LYMPHOCYTES NFR BLD: 15.7 %
MCH RBC QN AUTO: 32.9 PG (ref 26–34)
MCHC RBC AUTO-ENTMCNC: 34.6 G/DL (ref 31–36)
MCV RBC AUTO: 94.9 FL (ref 80–100)
MONOCYTES # BLD: 1.2 K/UL (ref 0–1.3)
MONOCYTES NFR BLD: 10.2 %
NEUTROPHILS # BLD: 8.2 K/UL (ref 1.7–7.7)
NEUTROPHILS NFR BLD: 72.1 %
PLATELET # BLD AUTO: 211 K/UL (ref 135–450)
PMV BLD AUTO: 8.2 FL (ref 5–10.5)
POTASSIUM SERPL-SCNC: 4.3 MMOL/L (ref 3.5–5.1)
RBC # BLD AUTO: 4.37 M/UL (ref 4.2–5.9)
SODIUM SERPL-SCNC: 134 MMOL/L (ref 136–145)
SPECIMEN SOURCE: NORMAL
WBC # BLD AUTO: 11.4 K/UL (ref 4–11)

## 2024-08-15 PROCEDURE — 93010 ELECTROCARDIOGRAM REPORT: CPT | Performed by: INTERNAL MEDICINE

## 2024-08-15 PROCEDURE — 6360000002 HC RX W HCPCS: Performed by: EMERGENCY MEDICINE

## 2024-08-15 PROCEDURE — 2720000010 HC SURG SUPPLY STERILE: Performed by: SURGERY

## 2024-08-15 PROCEDURE — 85025 COMPLETE CBC W/AUTO DIFF WBC: CPT

## 2024-08-15 PROCEDURE — 6370000000 HC RX 637 (ALT 250 FOR IP): Performed by: INTERNAL MEDICINE

## 2024-08-15 PROCEDURE — 85520 HEPARIN ASSAY: CPT

## 2024-08-15 PROCEDURE — 99152 MOD SED SAME PHYS/QHP 5/>YRS: CPT | Performed by: SURGERY

## 2024-08-15 PROCEDURE — 6360000002 HC RX W HCPCS: Performed by: SURGERY

## 2024-08-15 PROCEDURE — C1757 CATH, THROMBECTOMY/EMBOLECT: HCPCS | Performed by: SURGERY

## 2024-08-15 PROCEDURE — APPNB30 APP NON BILLABLE TIME 0-30 MINS: Performed by: NURSE PRACTITIONER

## 2024-08-15 PROCEDURE — 37184 PRIM ART M-THRMBC 1ST VSL: CPT | Performed by: SURGERY

## 2024-08-15 PROCEDURE — 2580000003 HC RX 258: Performed by: SURGERY

## 2024-08-15 PROCEDURE — 80048 BASIC METABOLIC PNL TOTAL CA: CPT

## 2024-08-15 PROCEDURE — B31S1ZZ FLUOROSCOPY OF RIGHT PULMONARY ARTERY USING LOW OSMOLAR CONTRAST: ICD-10-PCS | Performed by: SURGERY

## 2024-08-15 PROCEDURE — 81240 F2 GENE: CPT

## 2024-08-15 PROCEDURE — 2709999900 HC NON-CHARGEABLE SUPPLY: Performed by: SURGERY

## 2024-08-15 PROCEDURE — 2000000000 HC ICU R&B

## 2024-08-15 PROCEDURE — 99153 MOD SED SAME PHYS/QHP EA: CPT | Performed by: SURGERY

## 2024-08-15 PROCEDURE — C1894 INTRO/SHEATH, NON-LASER: HCPCS | Performed by: SURGERY

## 2024-08-15 PROCEDURE — 2500000003 HC RX 250 WO HCPCS: Performed by: SURGERY

## 2024-08-15 PROCEDURE — 02CR3ZZ EXTIRPATION OF MATTER FROM LEFT PULMONARY ARTERY, PERCUTANEOUS APPROACH: ICD-10-PCS | Performed by: SURGERY

## 2024-08-15 PROCEDURE — 36014 PLACE CATHETER IN ARTERY: CPT | Performed by: SURGERY

## 2024-08-15 PROCEDURE — 81241 F5 GENE: CPT

## 2024-08-15 PROCEDURE — B31T1ZZ FLUOROSCOPY OF LEFT PULMONARY ARTERY USING LOW OSMOLAR CONTRAST: ICD-10-PCS | Performed by: SURGERY

## 2024-08-15 PROCEDURE — 6360000004 HC RX CONTRAST MEDICATION: Performed by: SURGERY

## 2024-08-15 PROCEDURE — C1769 GUIDE WIRE: HCPCS | Performed by: SURGERY

## 2024-08-15 PROCEDURE — 02CP3ZZ EXTIRPATION OF MATTER FROM PULMONARY TRUNK, PERCUTANEOUS APPROACH: ICD-10-PCS | Performed by: SURGERY

## 2024-08-15 PROCEDURE — 93970 EXTREMITY STUDY: CPT | Performed by: SURGERY

## 2024-08-15 PROCEDURE — 86147 CARDIOLIPIN ANTIBODY EA IG: CPT

## 2024-08-15 PROCEDURE — 75743 ARTERY X-RAYS LUNGS: CPT | Performed by: SURGERY

## 2024-08-15 PROCEDURE — C1760 CLOSURE DEV, VASC: HCPCS | Performed by: SURGERY

## 2024-08-15 PROCEDURE — 6370000000 HC RX 637 (ALT 250 FOR IP): Performed by: SURGERY

## 2024-08-15 PROCEDURE — 99223 1ST HOSP IP/OBS HIGH 75: CPT | Performed by: INTERNAL MEDICINE

## 2024-08-15 PROCEDURE — 02CQ3ZZ EXTIRPATION OF MATTER FROM RIGHT PULMONARY ARTERY, PERCUTANEOUS APPROACH: ICD-10-PCS | Performed by: SURGERY

## 2024-08-15 PROCEDURE — APPSS30 APP SPLIT SHARED TIME 16-30 MINUTES: Performed by: NURSE PRACTITIONER

## 2024-08-15 PROCEDURE — 93970 EXTREMITY STUDY: CPT

## 2024-08-15 PROCEDURE — C1753 CATH, INTRAVAS ULTRASOUND: HCPCS | Performed by: SURGERY

## 2024-08-15 RX ORDER — MIDAZOLAM HYDROCHLORIDE 1 MG/ML
INJECTION INTRAMUSCULAR; INTRAVENOUS PRN
Status: DISCONTINUED | OUTPATIENT
Start: 2024-08-15 | End: 2024-08-15 | Stop reason: HOSPADM

## 2024-08-15 RX ORDER — SODIUM CHLORIDE 0.9 % (FLUSH) 0.9 %
5-40 SYRINGE (ML) INJECTION EVERY 12 HOURS SCHEDULED
Status: DISCONTINUED | OUTPATIENT
Start: 2024-08-15 | End: 2024-08-16 | Stop reason: HOSPADM

## 2024-08-15 RX ORDER — SODIUM CHLORIDE 0.9 % (FLUSH) 0.9 %
5-40 SYRINGE (ML) INJECTION PRN
Status: DISCONTINUED | OUTPATIENT
Start: 2024-08-15 | End: 2024-08-16 | Stop reason: HOSPADM

## 2024-08-15 RX ORDER — HEPARIN SODIUM 1000 [USP'U]/ML
INJECTION, SOLUTION INTRAVENOUS; SUBCUTANEOUS PRN
Status: DISCONTINUED | OUTPATIENT
Start: 2024-08-15 | End: 2024-08-15 | Stop reason: HOSPADM

## 2024-08-15 RX ORDER — SODIUM CHLORIDE 9 MG/ML
INJECTION, SOLUTION INTRAVENOUS PRN
Status: DISCONTINUED | OUTPATIENT
Start: 2024-08-15 | End: 2024-08-16 | Stop reason: HOSPADM

## 2024-08-15 RX ORDER — ACETAMINOPHEN 325 MG/1
650 TABLET ORAL EVERY 4 HOURS PRN
Status: DISCONTINUED | OUTPATIENT
Start: 2024-08-15 | End: 2024-08-16 | Stop reason: HOSPADM

## 2024-08-15 RX ORDER — FENTANYL CITRATE 50 UG/ML
INJECTION, SOLUTION INTRAMUSCULAR; INTRAVENOUS PRN
Status: DISCONTINUED | OUTPATIENT
Start: 2024-08-15 | End: 2024-08-15 | Stop reason: HOSPADM

## 2024-08-15 RX ADMIN — ATORVASTATIN CALCIUM 40 MG: 40 TABLET, FILM COATED ORAL at 20:21

## 2024-08-15 RX ADMIN — SODIUM CHLORIDE, PRESERVATIVE FREE 10 ML: 5 INJECTION INTRAVENOUS at 20:22

## 2024-08-15 RX ADMIN — HEPARIN SODIUM 16 UNITS/KG/HR: 10000 INJECTION, SOLUTION INTRAVENOUS at 22:01

## 2024-08-15 RX ADMIN — POLYVINYL ALCOHOL, POVIDONE 1 DROP: 14; 6 SOLUTION/ DROPS OPHTHALMIC at 08:28

## 2024-08-15 RX ADMIN — HEPARIN SODIUM 17 UNITS/KG/HR: 10000 INJECTION, SOLUTION INTRAVENOUS at 02:33

## 2024-08-15 ASSESSMENT — PAIN SCALES - GENERAL
PAINLEVEL_OUTOF10: 0

## 2024-08-15 NOTE — PROGRESS NOTES
Nutrition Note    RECOMMENDATIONS  PO Diet: Resume regular diet as appropriate  ONS: If po intakes of meals are consistently <50%, please order Ensure HP   Nursing: RD ordered wt. Obtain actual wt of the pt and document wt method    ASSESSMENT   Pt triggered for positive nutrition screen. Upon visiting, pt reported decreased appetite/po intake over the last 1.5 months with no more than 10 lb unintentional wt loss. Only stated wt of 230 lb documented for current admission, unable to assess for recent wt change at this time. RD will monitor for adequate po intake once diet is resumed as currently NPO for pulmonary thrombectomy vs need for ONS.     Malnutrition Status: Insufficient data  Acute Illness    NUTRITION DIAGNOSIS   Inadequate oral intake related to inadequate protein-energy intake as evidenced by poor intake prior to admission    Goals: PO intake 50% or greater, by next RD assessment     NUTRITION RELATED FINDINGS  Objective: Na 134. LBM pta. No edema noted.  Wounds: None    CURRENT NUTRITION THERAPIES  Diet NPO     PO Intake: NPO   PO Supplement Intake:NPO    ANTHROPOMETRICS  Current Height: 180.3 cm (5' 11\")  Current Weight - Scale: 104.3 kg (230 lb)    Ideal Body Weight (IBW): 172 lbs  (78 kg)       BMI: 32.1    COMPARATIVE STANDARDS  Total Energy Requirements (kcals/day): unable to calculate estimated energy needs until actual wt of the pt is obtained       EDUCATION  Education not indicated     The patient will be monitored per nutrition standards of care. Consult dietitian if additional nutrition interventions are needed prior to RD reassessment.     Patricia Ibarra MS, RD, LD    Contact: [phone]

## 2024-08-15 NOTE — CONSULTS
[facility]    Consult Note      Requesting Physician:  The hospitalist    CHIEF COMPLAINT:  Chest pain and shortness of breath as well as right leg pain.      HISTORY OF PRESENT ILLNESS:    Mr. Warren  is a 74 y.o. male we are seeing in consultation for Pulmonary embolism and a right lower extremity DVT.  He developed intermittent right calf pain after he drove to [place] for 5 to 6 hours in early July.  He developed chest pain and shortness of breath late last week.  He came to ED and CTPA yesterday showed saddle pulmonary embolus with evidence of right heart strain.  Venous Doppler demonstrated acute nonocclusive DVT in the right distal femoral, popliteal and posterior tibial vein.  CT abdomen and pelvis without contrast showed no evidence of mass or lymphadenopathy.  Mild sigmoid diverticulosis, small hiatal hernia and small fat-containing left inguinal hernia as well as stable mild prostamegaly.  Currently is on heparin.  He will undergo thrombectomy.  He reports that his father was diagnosed thrombosis.        ICD-10-CM    1. Shortness of breath  R06.02 Echo (TTE) complete (PRN contrast/bubble/strain/3D)     Echo (TTE) complete (PRN contrast/bubble/strain/3D)      2. Acute respiratory failure with hypoxia ([facility])  J96.01       3. Acute saddle pulmonary embolism with acute cor pulmonale ([facility])  I26.02 Vascular duplex lower extremity venous bilateral     Vascular duplex lower extremity venous bilateral      4. Elevated troponin  R79.89              Past Medical History:  Past Medical History:   Diagnosis Date    Actinic keratosis     BPH (benign prostatic hypertrophy)     BPH w/o urinary obs/LUTS     Bulging disc 1990s    lumbar    Chest pain     Contusion of back     Costochondritis 9/7/2011    Degeneration of intervertebral disc, site unspecified     Diarrhea 9/7/2011    Dysphagia,  Initial Consultation Note      HISTORY OF PRESENT ILLNESS:  Bessy Marques is a 72year old old male referred to the pain clinic  for evaluation treatment of his low back and cervical neck pain .    he continues to take 3 Norco a day for pain control over 90 tablet 0   • ibuprofen 600 MG Oral Tab TAKE 1 TABLET EVERY 12 HOURS AS NEEDED FOR PAIN (SUBSTITUTED FOR MOTRIN) 180 tablet 0   • Tadalafil 20 MG Oral Tab Take 1 tablet (20 mg total) by mouth daily as needed for Erectile Dysfunction.  10 tablet 11 unspecified     Eosinophilic esophagitis [id number]    DR WILLIE MCCRARY, EGD    HCV (hepatitis C virus) 12/1997    Hyperpotassemia     Lumbago     Lumbar strain     acute     Other malignant neoplasm of skin, site unspecified     Pain in joint, shoulder region     Partial Achilles tendon tear     LT     Pharyngitis 9/7/2011    Schatzki's ring [phone]    DR WILLIE MCCRARY, EGD    Screening colonoscopy     colonoscopy (benign polyps) every 5 years 3/2007    Stricture and stenosis of esophagus        Past Surgical History:  Past Surgical History:   Procedure Laterality Date    APPENDECTOMY  02/2000        COLONOSCOPY  2012    DR WILLIE MCCRARY, COLONOSCOPY, NO POLYPS, REPEAT 5 YRS    EXTERNAL EAR SURGERY  1/13    left - SCC    KNEE ARTHROSCOPY  1/2000    left    TURP  1/25/16    UPPER GASTROINTESTINAL ENDOSCOPY  [id number]    DR WILLEI MCCRARY, SCHATZKI'S RING AND EOSINOHILIC ESOPHAGITIS       Current Medications:  Current Facility-Administered Medications   Medication Dose Route Frequency Provider Last Rate Last Admin    heparin (porcine) injection 8,300 Units  80 Units/kg IntraVENous PRN PendAsad ely DO        heparin (porcine) injection 4,200 Units  40 Units/kg IntraVENous PRN PendAsad ely DO        heparin 25,000 units in dextrose 5% 250 mL (premix) infusion  5-30 Units/kg/hr IntraVENous Continuous Asad Ireland DO 16.7 mL/hr at 08/15/24 0905 16 Units/kg/hr at 08/15/24 0905    atorvastatin (LIPITOR) tablet 40 mg  40 mg Oral Nightly Mireille Centeno MD   40 mg at 08/14/24 2041    Polyvinyl Alcohol-Povidone PF (REFRESH) 1.4-0.6 % ophthalmic solution 1 drop  1 drop Both Eyes Daily Mireille Centeno MD   1 drop at 08/15/24 0828    sodium chloride flush 0.9 % injection 5-40 mL  5-40 mL IntraVENous 2 times per day Mireille Centeno MD   10 mL at 08/14/24 2042    sodium chloride flush 0.9 % injection 5-40 mL  5-40 mL IntraVENous PRN Mireille Centeno MD        0.9 % sodium chloride infusion   IntraVENous PRN Jacobo  Mother    • Diabetes Sister    • Obesity Sister    • Lipids Other         family h/o hyperlipidemia and vascular disease       SOCIAL HISTORY:  Social History    Socioeconomic History      Marital status:       Spouse name: Not on file      Number o Asked        Bike Helmet: Not Asked        Seat Belt: Not Asked        Self-Exams: Not Asked        Grew up on a farm: Not Asked        History of tanning: Not Asked        Outdoor occupation: Not Asked        Pt has a pacemaker: No        Pt has a defibri Normal    IMAGING:  No results found.     LABS:  Lab Results   Component Value Date    WBC 10.9 07/03/2019    RBC 5.47 07/03/2019    HGB 14.7 07/03/2019    HCT 46.8 07/03/2019    MCV 85.6 07/03/2019    MCH 26.9 07/03/2019    MCHC 31.4 07/03/2019    RDW 18.3

## 2024-08-15 NOTE — PROGRESS NOTES
show evidence of RV strain.  Patient still with SOB with activity.  Discussed option of moving forward with pulmonary thrombectomy versus conservative management.  He wants to move forward with pulmonary thrombectomy and we will plan for this later today with Dr. Dc.  Keep NPO.       Patient educated on plan of care and disease process.  All questions answered.        Electronically signed by ALICE Herron CNP on 8/15/2024 at 8:54 AM

## 2024-08-15 NOTE — PROGRESS NOTES
[facility]    8/15/2024 11:58 AM        Name: Raul Warren .              Admitted: 8/14/2024  Primary Care Provider: Stephan Vásquez MD (Tel: [phone])      Subjective:    Patient lying in bed shortness of breath improved nausea improved heart rate is improving    Reviewed interval ancillary notes    Current Medications  heparin (porcine) injection 8,300 Units, PRN  heparin (porcine) injection 4,200 Units, PRN  heparin 25,000 units in dextrose 5% 250 mL (premix) infusion, Continuous  atorvastatin (LIPITOR) tablet 40 mg, Nightly  Polyvinyl Alcohol-Povidone PF (REFRESH) 1.4-0.6 % ophthalmic solution 1 drop, Daily  sodium chloride flush 0.9 % injection 5-40 mL, 2 times per day  sodium chloride flush 0.9 % injection 5-40 mL, PRN  0.9 % sodium chloride infusion, PRN  polyethylene glycol (GLYCOLAX) packet 17 g, Daily PRN  acetaminophen (TYLENOL) tablet 650 mg, Q6H PRN   Or  acetaminophen (TYLENOL) suppository 650 mg, Q6H PRN        Objective:  BP (!) 139/102   Pulse 90   Temp 97 °F (36.1 °C) (Temporal)   Resp 16   Ht 1.803 m (5' 11\")   Wt 104.3 kg (230 lb)   SpO2 97%   BMI 32.08 kg/m²     Intake/Output Summary (Last 24 hours) at 8/15/2024 1158  Last data filed at 8/15/2024 0638  Gross per 24 hour   Intake --   Output 500 ml   Net -500 ml      Wt Readings from Last 3 Encounters:   08/14/24 104.3 kg (230 lb)   08/13/24 105.2 kg (232 lb)   05/16/24 106.6 kg (235 lb)       General appearance:  Appears comfortable. AAOx3  HEENT: atraumatic, Pupils equal, muscous membranes moist, no masses appreciated  Cardiovascular: rrr no murmurs appreciated  Respiratory: CTAB no wheezing  Gastrointestinal: Abdomen soft, non-tender, BS+  EXT: no edema  Neurology: no gross focal deficts  Psychiatry: Appropriate affect. Not agitated  Skin: Warm, dry, no rashes appreciated       Labs and Tests:  CBC:   Recent Labs     08/14/24  1128  08/15/24  0249   WBC 13.9* 11.4*   HGB 14.6 14.3    211     BMP:    Recent Labs     08/14/24  1128 08/15/24  0249   * 134*   K 4.2 4.3   CL 98* 101   CO2 20* 22   BUN 11 12   CREATININE 1.0 1.1   GLUCOSE 178* 141*     Hepatic:   Recent Labs     08/14/24  1128   AST 20   ALT 22   BILITOT 0.7   ALKPHOS 67     Vascular duplex lower extremity venous bilateral         CT CHEST PULMONARY EMBOLISM W CONTRAST   Final Result   Addendum (preliminary) 1 of 1   ADDENDUM:   Critical results were called by Dr. Yosef Hines to Dr. Ireland on    8/14/2024   at 13:18.         Final   Saddle pulmonary embolus with CT evidence of right heart strain.         CT ABDOMEN PELVIS WO CONTRAST Additional Contrast? None   Preliminary Result   1. No acute intra-abdominal or intrapelvic pathology is identified.   Specifically, there is no evidence of a renal or ureteral calculus or   hydronephrosis.   2. Mild sigmoid diverticulosis, though no evidence of diverticulitis.   3. Small hiatal hernia.   4. Small fat-containing left inguinal hernia.   5. Stable mild prostatomegaly, with a chronic TURP defect noted.         XR CHEST PORTABLE   Final Result   No acute process.             Recent imaging reviewed    Problem List  Principal Problem:    Acute saddle pulmonary embolism with acute cor pulmonale (HCC)  Resolved Problems:    * No resolved hospital problems. *       Assessment/Plan:   Acute saddle embolism  - iv heparin gtt  Tele  Plan for thrombectomy  Echo with rv strain   do[pplers right dvt  Heme consult pending     DVT prophylaxis heparin gtt  Code status full code     I spent 31 minutes providing critical care services to this patient thus far today      Mireille Centeno MD   8/15/2024 11:58 AM

## 2024-08-15 NOTE — PROGRESS NOTES
Pt returned from cath lab. Rt groin site noted with no swelling or redness. Minimal bloody drainage noted to dressing, unchanged per cath lab RN.

## 2024-08-15 NOTE — PROGRESS NOTES
Anti-Xa results within physician ordered goal. No bolus, no change. Resume heparin gtt @ 16 units/kg/hr.     Latest Reference Range & Units Most Recent   Anti-XA Unfrac Heparin 0.30 - 0.70 IU/mL 0.62  8/15/24 09:05

## 2024-08-15 NOTE — CARE COORDINATION
Chart reviewed at this time for discharge planning.    Pt from home. Has PCP and insurance.    Per vascular note will have pulmonary thrombectomy later today.    Case management will follow for needs.    Fatou Vigil RN, BSN  [phone]

## 2024-08-15 NOTE — CONSULTS
[facility]   Consult Note      Reason for Consult: Saddle PE with syncope  Requesting Physician: Mireille Centeno    Subjective:   CHIEF COMPLAINT: Shortness of breath and chest heaviness     HPI: Patient states that he has been feeling more out of breath particularly with any form of exertion and associated chest heaviness x 1 week.  Some nonproductive cough, denies pain.  Yesterday had syncope following visit to the bathroom at home-hence brought to the ER for an evaluation.  Noted to have saddle with bilateral PE with RV strain.  Plans for mechanical thrombectomy by vascular surgery.  Patient also noted to have right calf pain and swelling.  History of travel to [place] [5-hour drive]-back and forth in June.  Patient's brother has history of \"blood clots\".    Currently patient states that he has some shortness of breath and chest heaviness, denies dizziness/lightheadedness.  No arrhythmias or syncope following hospitalization.    Former cigarette smoker, now smokes cigars.  No prior history of asthma or COPD.  No history of coronary artery disease.       The patient is a 74 y.o. male with significant past medical history of:      Diagnosis Date    Actinic keratosis     BPH (benign prostatic hypertrophy)     BPH w/o urinary obs/LUTS     Bulging disc 1990s    lumbar    Chest pain     Contusion of back     Costochondritis 9/7/2011    Degeneration of intervertebral disc, site unspecified     Diarrhea 9/7/2011    Dysphagia, unspecified     Eosinophilic esophagitis [id number]    DR WILLIE MCCRARY, EGD    HCV (hepatitis C virus) 12/1997    Hyperpotassemia     Lumbago     Lumbar strain     acute     Other malignant neoplasm of skin, site unspecified     Pain in joint, shoulder region     Partial Achilles tendon tear     LT     Pharyngitis 9/7/2011    Schatzki's ring [phone]    DR WILLIE MCCRARY, EGD    Screening colonoscopy     colonoscopy (benign polyps) every 5 years 3/2007    Stricture and stenosis of  atraumatic  Eyes: pupils equal, round, and reactive to light, extraocular eye movements intact, conjunctivae normal  ENT: external ear and ear canal normal bilaterally, nose without deformity, nasal mucosa and turbinates normal  Neck: supple and non-tender without mass, no cervical lymphadenopathy  Pulmonary/Chest: clear to auscultation bilaterally- no wheezes, rales or rhonchi, normal air movement, no respiratory distress  Cardiovascular: normal rate, regular rhythm,  no murmurs, rubs, distal pulses intact, no carotid bruits  Abdomen: soft, non-tender, non-distended, normal bowel sounds, no masses or organomegaly  Lymph Nodes: Cervical, supraclavicular normal  Extremities: no cyanosis, clubbing or edema  Musculoskeletal: normal range of motion, no joint swelling, deformity or tenderness  Neurologic: alert, no focal neurologic deficits    Data Review:  CBC:   Lab Results   Component Value Date/Time    WBC 11.4 08/15/2024 02:49 AM    RBC 4.37 08/15/2024 02:49 AM     BMP:   Lab Results   Component Value Date/Time    GLUCOSE 141 08/15/2024 02:49 AM    GLUCOSE 109 04/26/2011 10:30 AM    CO2 22 08/15/2024 02:49 AM    BUN 12 08/15/2024 02:49 AM    CREATININE 1.1 08/15/2024 02:49 AM    CALCIUM 8.7 08/15/2024 02:49 AM     ABG: No results found for: \"KEY2NEY\", \"BEART\", \"I2NRKWLF\", \"PHART\", \"THGBART\", \"MRI5TNC\", \"PO2ART\", \"QVD7UND\"    Radiology: All pertinent images / reports were reviewed as a part of this visit.    EXAMINATION:  CTA OF THE CHEST 8/14/2024 12:24 pm     TECHNIQUE:  CTA of the chest was performed after the administration of intravenous  contrast.  Multiplanar reformatted images are provided for review.  MIP  images are provided for review. Automated exposure control, iterative  reconstruction, and/or weight based adjustment of the mA/kV was utilized to  reduce the radiation dose to as low as reasonably achievable.     COMPARISON:  Chest x-ray August 14, 2024     HISTORY:  ORDERING SYSTEM PROVIDED HISTORY:

## 2024-08-16 VITALS
TEMPERATURE: 96.8 F | WEIGHT: 227.74 LBS | HEART RATE: 92 BPM | DIASTOLIC BLOOD PRESSURE: 84 MMHG | HEIGHT: 71 IN | OXYGEN SATURATION: 95 % | RESPIRATION RATE: 18 BRPM | BODY MASS INDEX: 31.88 KG/M2 | SYSTOLIC BLOOD PRESSURE: 134 MMHG

## 2024-08-16 LAB
ANION GAP SERPL CALCULATED.3IONS-SCNC: 9 MMOL/L (ref 3–16)
ANTI-XA UNFRAC HEPARIN: 0.27 IU/ML (ref 0.3–0.7)
BASOPHILS # BLD: 0.1 K/UL (ref 0–0.2)
BASOPHILS NFR BLD: 1 %
BUN SERPL-MCNC: 14 MG/DL (ref 7–20)
CALCIUM SERPL-MCNC: 8.4 MG/DL (ref 8.3–10.6)
CHLORIDE SERPL-SCNC: 104 MMOL/L (ref 99–110)
CO2 SERPL-SCNC: 23 MMOL/L (ref 21–32)
CREAT SERPL-MCNC: 0.8 MG/DL (ref 0.8–1.3)
DEPRECATED RDW RBC AUTO: 12.8 % (ref 12.4–15.4)
EOSINOPHIL # BLD: 0.3 K/UL (ref 0–0.6)
EOSINOPHIL NFR BLD: 3.9 %
GFR SERPLBLD CREATININE-BSD FMLA CKD-EPI: >90 ML/MIN/{1.73_M2}
GLUCOSE SERPL-MCNC: 103 MG/DL (ref 70–99)
HCT VFR BLD AUTO: 39.4 % (ref 40.5–52.5)
HGB BLD-MCNC: 13.5 G/DL (ref 13.5–17.5)
LYMPHOCYTES # BLD: 1.9 K/UL (ref 1–5.1)
LYMPHOCYTES NFR BLD: 21.9 %
MCH RBC QN AUTO: 32.2 PG (ref 26–34)
MCHC RBC AUTO-ENTMCNC: 34.3 G/DL (ref 31–36)
MCV RBC AUTO: 94 FL (ref 80–100)
MONOCYTES # BLD: 0.8 K/UL (ref 0–1.3)
MONOCYTES NFR BLD: 8.7 %
NEUTROPHILS # BLD: 5.7 K/UL (ref 1.7–7.7)
NEUTROPHILS NFR BLD: 64.5 %
PLATELET # BLD AUTO: 224 K/UL (ref 135–450)
PMV BLD AUTO: 8.1 FL (ref 5–10.5)
POTASSIUM SERPL-SCNC: 4.1 MMOL/L (ref 3.5–5.1)
RBC # BLD AUTO: 4.19 M/UL (ref 4.2–5.9)
SODIUM SERPL-SCNC: 136 MMOL/L (ref 136–145)
WBC # BLD AUTO: 8.9 K/UL (ref 4–11)

## 2024-08-16 PROCEDURE — 6360000002 HC RX W HCPCS: Performed by: SURGERY

## 2024-08-16 PROCEDURE — 85520 HEPARIN ASSAY: CPT

## 2024-08-16 PROCEDURE — 99233 SBSQ HOSP IP/OBS HIGH 50: CPT | Performed by: INTERNAL MEDICINE

## 2024-08-16 PROCEDURE — 85025 COMPLETE CBC W/AUTO DIFF WBC: CPT

## 2024-08-16 PROCEDURE — 6370000000 HC RX 637 (ALT 250 FOR IP): Performed by: INTERNAL MEDICINE

## 2024-08-16 PROCEDURE — 6370000000 HC RX 637 (ALT 250 FOR IP): Performed by: SURGERY

## 2024-08-16 PROCEDURE — APPNB30 APP NON BILLABLE TIME 0-30 MINS: Performed by: NURSE PRACTITIONER

## 2024-08-16 PROCEDURE — 80048 BASIC METABOLIC PNL TOTAL CA: CPT

## 2024-08-16 PROCEDURE — APPSS30 APP SPLIT SHARED TIME 16-30 MINUTES: Performed by: NURSE PRACTITIONER

## 2024-08-16 PROCEDURE — 2580000003 HC RX 258: Performed by: SURGERY

## 2024-08-16 RX ADMIN — APIXABAN 10 MG: 5 TABLET, FILM COATED ORAL at 12:23

## 2024-08-16 RX ADMIN — HEPARIN SODIUM 4200 UNITS: 1000 INJECTION INTRAVENOUS; SUBCUTANEOUS at 06:01

## 2024-08-16 RX ADMIN — POLYVINYL ALCOHOL, POVIDONE 1 DROP: 14; 6 SOLUTION/ DROPS OPHTHALMIC at 09:09

## 2024-08-16 RX ADMIN — SODIUM CHLORIDE, PRESERVATIVE FREE 10 ML: 5 INJECTION INTRAVENOUS at 09:10

## 2024-08-16 NOTE — PLAN OF CARE
Problem: Pain  Goal: Verbalizes/displays adequate comfort level or baseline comfort level  Outcome: Progressing     Problem: ABCDS Injury Assessment  Goal: Absence of physical injury  Outcome: Progressing     Problem: Nutrition Deficit:  Goal: Optimize nutritional status  Outcome: Progressing

## 2024-08-16 NOTE — PROGRESS NOTES
AntiXa 0.32- no change in heparin gtt, continues at 16u/kg/hr. Pt resting well with VSS. Denies any pain. Groin dressing with d-stat in place, site unchanged. + palpable pedal pulse. Will cont to monitor.

## 2024-08-16 NOTE — PROGRESS NOTES
[facility] Pulmonary/CCM Progress note      Admit Date: 8/14/2024    Chief Complaint: Shortness of breath and chest heaviness    Subjective:     Interval History: Status post mechanical thrombectomy for saddle PE.  Also noted to have right lower extremity DVT.  Still has dyspnea with exertion, denies any dizziness/lightheadedness.  Hemodynamically stable.  Continues on IV heparin drip.    Scheduled Meds:   apixaban  10 mg Oral BID    Followed by    [START ON 8/23/2024] apixaban  5 mg Oral BID    sodium chloride flush  5-40 mL IntraVENous 2 times per day    atorvastatin  40 mg Oral Nightly    Polyvinyl Alcohol-Povidone PF  1 drop Both Eyes Daily    sodium chloride flush  5-40 mL IntraVENous 2 times per day     Continuous Infusions:   sodium chloride      sodium chloride       PRN Meds:sodium chloride flush, sodium chloride, acetaminophen, sodium chloride flush, sodium chloride, polyethylene glycol, [DISCONTINUED] acetaminophen **OR** acetaminophen    Review of Systems  Constitutional: negative for fatigue, fevers, malaise and weight loss  Ears, nose, mouth, throat: negative for ear drainage, epistaxis, hoarseness, nasal congestion, sore throat and voice change  Respiratory: negative except for dyspnea on exertion and shortness of breath  Cardiovascular: negative for chest pain, chest pressure/discomfort, irregular heart beat, lower extremity edema and palpitations  Gastrointestinal: negative for abdominal pain, constipation, diarrhea, jaundice, melena, odynophagia, reflux symptoms and vomiting  Hematologic/lymphatic: negative for bleeding, easy bruising, lymphadenopathy and petechiae  Musculoskeletal:negative for arthralgias, bone pain, muscle weakness, neck pain and stiff joints  Neurological: negative for dizziness, gait problems, headaches, seizures, speech problems, tremors and weakness  Behavioral/Psych: negative for anxiety, behavior problems, depression, fatigue and sleep disturbance  Endocrine: negative for diabetic  05:20 AM    CREATININE 0.8 08/16/2024 05:20 AM    CALCIUM 8.4 08/16/2024 05:20 AM     ABG: No results found for: \"CAD8OND\", \"BEART\", \"N1DIEYMA\", \"PHART\", \"THGBART\", \"JBR9UPA\", \"PO2ART\", \"CWN6EAN\"    Radiology: All pertinent images / reports were reviewed as a part of this visit.    EXAMINATION:  CTA OF THE CHEST 8/14/2024 12:24 pm     TECHNIQUE:  CTA of the chest was performed after the administration of intravenous  contrast.  Multiplanar reformatted images are provided for review.  MIP  images are provided for review. Automated exposure control, iterative  reconstruction, and/or weight based adjustment of the mA/kV was utilized to  reduce the radiation dose to as low as reasonably achievable.     COMPARISON:  Chest x-ray August 14, 2024     HISTORY:  ORDERING SYSTEM PROVIDED HISTORY: Respiratory failure, elevated D-dimer        FINDINGS:  Pulmonary Arteries: Saddle pulmonary embolus with extensive but nonocclusive  thrombus in the right greater than left main pulmonary arteries with  extension into the segmental and subsegmental pulmonary arteries bilaterally.     Mediastinum: Right ventricle and atrium are enlarged with RV LV ratio of 2.5.  Mild scattered calcified plaque in the thoracic aorta.  LAD coronary artery  calcification.  No adenopathy.  Thyroid is normal.  Small hiatal hernia.     Lungs/pleura: Mild atelectasis.  No pleural effusion or pneumothorax.     Upper Abdomen: Limited images of the upper abdomen are unremarkable.     Soft Tissues/Bones: Mild multilevel degenerative changes of the spine.  No  acute osseous abnormality.     IMPRESSION:  Saddle pulmonary embolus with CT evidence of right heart strain.    Problem List:     Saddle PE with RV strain/submassive PE  Syncope    Assessment/Plan:     Saddle PE with syncope, status post mechanical thrombectomy on 8/15.  No postoperative complications.  CT does not show pulmonary infarction.  Has significant RV strain on 2D echo.  Right lower extremity DVT

## 2024-08-16 NOTE — PLAN OF CARE
Problem: Pain  Goal: Verbalizes/displays adequate comfort level or baseline comfort level  8/16/2024 1231 by Tati Stephens RN  Outcome: Adequate for Discharge  8/16/2024 0040 by Velma Love RN  Outcome: Progressing     Problem: ABCDS Injury Assessment  Goal: Absence of physical injury  8/16/2024 1231 by Tati Stephens RN  Outcome: Adequate for Discharge  8/16/2024 0040 by Velma Love RN  Outcome: Progressing     Problem: Nutrition Deficit:  Goal: Optimize nutritional status  8/16/2024 1231 by Tati Stephens RN  Outcome: Adequate for Discharge  8/16/2024 0040 by Velma Love RN  Outcome: Progressing

## 2024-08-16 NOTE — CARE COORDINATION
Patient discharged 8/16/2024 to home  All discharge needs met per case management     Fatou Vigil RN, BSN  [phone]

## 2024-08-16 NOTE — PROGRESS NOTES
Eliquis Counseling Note    Raul Warren was counseled on Eliquis for DVT/PE.     Duration of therapy: 3-6 months    Indication, duration of therapy, and signs/symptoms related to bleeding were discussed.  Raul Warren had opportunity to ask questions. No barriers to education were noted.    Liam Roberts  PharmD Candidate 2025

## 2024-08-16 NOTE — PROGRESS NOTES
AntiXa 0.27- heparin gtt increased and bolus given per order. Pt rested well overnight. VSS. SaO2 maintained on RA. R groin site unremarkable, transparent dressing applied. R pedal pulse palpable. Pt ambulated in hallway this AM and assisted with shower. Denies any pain or SOB. Will cont to monitor.

## 2024-08-16 NOTE — PROGRESS NOTES
ONCOLOGY HEMATOLOGY CARE PROGRESS NOTE      SUBJECTIVE:  Denies chest pain or shortness of breath.    ROS:     Constitutional:  No weight loss, No fever, No chills, No night sweats.  Energy level good.  Eyes:  No impairment or change in vision  ENT / Mouth:  No pain, abnormal ulceration, bleeding, nasal drip or change in voice or hearing  Cardiovascular:  No chest pain, palpitations, new edema, or calf discomfort  Respiratory:  No pain, hemoptysis, change to breathing  Gastrointestinal:  No pain, cramping, jaundice, change to eating and bowel habits  Urinary:  No pain, bleeding or change in continence  Genitalia: No pain, bleeding or discharge  Musculoskeletal:  No redness, pain, edema or weakness  Skin:  No pruritus, rash, change to nodules or lesions  Neurologic:  No discomfort, change in mental status, speech, sensory or motor activity  Psychiatric:  No change in concentration or change to affect or mood  Endocrine:  No hot flashes, increased thirst, or change to urine production  Hematologic: No petechiae, ecchymosis or bleeding  Lymphatic:  No lymphadenopathy or lymphedema  Allergy / Immunologic:  No eczema, hives, frequent or recurrent infections    OBJECTIVE        Physical    VITALS:  Patient Vitals for the past 24 hrs:   BP Temp Temp src Pulse Resp SpO2 Weight   08/16/24 0749 (!) 135/92 97.7 °F (36.5 °C) Temporal 81 18 98 % --   08/16/24 0600 -- -- -- 96 -- -- --   08/16/24 0500 (!) 150/92 -- -- 75 -- -- 103.3 kg (227 lb 11.8 oz)   08/16/24 0400 (!) 169/95 98 °F (36.7 °C) Temporal 71 18 95 % --   08/16/24 0300 (!) 153/97 -- -- 79 -- -- --   08/16/24 0200 (!) 158/105 -- -- 76 -- -- --   08/16/24 0100 133/76 -- -- 72 -- -- --   08/16/24 0000 121/72 98.2 °F (36.8 °C) Temporal 72 18 97 % --   08/15/24 2300 (!) 155/88 -- -- 69 -- -- --   08/15/24 2200 134/72 -- -- 80 -- 99 % --   08/15/24 2100 (!) 124/108 -- -- 92 -- 93 % --   08/15/24 2000 (!) 138/111 98.9 °F (37.2 °C)

## 2024-08-16 NOTE — PROGRESS NOTES
CLINICAL PHARMACY NOTE: MEDS TO BEDS    Total # of Prescriptions Filled: 1   The following medications were delivered to the patient:  eliquis    Additional Documentation:  Patient picked up

## 2024-08-16 NOTE — PROGRESS NOTES
Vascular Progress Note    8/16/2024 8:09 AM    Chief complaint / Reason for visit : saddle PE    Subjective:  Patient resting in bed.  He reports he is doing okay.  Walked in [place] and took shower, had some SOB after activity.  VSS, afebrile, on room air.  On Heparin drip.     Vital Signs: BP (!) 135/92   Pulse 81   Temp 97.7 °F (36.5 °C) (Temporal)   Resp 18   Ht 1.803 m (5' 11\")   Wt 103.3 kg (227 lb 11.8 oz)   SpO2 98%   BMI 31.76 kg/m²  O2 Flow Rate (L/min): 2 L/min   I/O:    Intake/Output Summary (Last 24 hours) at 8/16/2024 0809  Last data filed at 8/16/2024 0514  Gross per 24 hour   Intake 874.76 ml   Output 560 ml   Net 314.76 ml       Physical Exam:   General: no apparent distress, appears stated age  Chest/Lungs: no accessory muscle use  Cardiac:  regular rate and rhythm  Vascular:  radial pulses normal  Extremities: warm and well perfused, no signs of cyanosis or ischemia, trace RLE edema, bilateral upper and lower extremity motorsensory intact  Skin: right groin site soft with no bleeding or hematoma     Labs:   Lab Results   Component Value Date/Time     08/16/2024 05:20 AM    K 4.1 08/16/2024 05:20 AM     08/16/2024 05:20 AM    CO2 23 08/16/2024 05:20 AM    BUN 14 08/16/2024 05:20 AM    CREATININE 0.8 08/16/2024 05:20 AM    GFRAA >60 03/09/2022 09:39 AM    GFRAA >60 09/17/2012 09:17 AM    LABGLOM >90 08/16/2024 05:20 AM    LABGLOM >60 03/29/2023 08:18 AM    GLUCOSE 103 08/16/2024 05:20 AM    GLUCOSE 109 04/26/2011 10:30 AM    CALCIUM 8.4 08/16/2024 05:20 AM     Lab Results   Component Value Date/Time    WBC 8.9 08/16/2024 05:20 AM    RBC 4.19 08/16/2024 05:20 AM    HGB 13.5 08/16/2024 05:20 AM    HCT 39.4 08/16/2024 05:20 AM    MCV 94.0 08/16/2024 05:20 AM    RDW 12.8 08/16/2024 05:20 AM     08/16/2024 05:20 AM     Lab Results   Component Value Date    INR 1.09 08/14/2024    PROTIME 14.3 08/14/2024        Imaging:    CT abd/pelvis w/o 8/14/24:  IMPRESSION:  1. No acute  intra-abdominal or intrapelvic pathology is identified.  Specifically, there is no evidence of a renal or ureteral calculus or  hydronephrosis.  2. Mild sigmoid diverticulosis, though no evidence of diverticulitis.  3. Small hiatal hernia.  4. Small fat containing left inguinal hernia.  5. Stable mild prostatomegaly, with a chronic TURP defect noted.     CTPE chest 8/14/24:  IMPRESSION:  Saddle pulmonary embolus with CT evidence of right heart strain.    ECHO 8/14/24:  Interpretation Summary  Show Result Comparison     Left Ventricle: Hyperdynamic left ventricular systolic function with a visually estimated EF of 65 - 70%. EF by 2D Simpsons Biplane is 74%. Left ventricle size is normal. Moderately increased wall thickness. Findings consistent with moderate concentric hypertrophy. Septal flattening in diastole consistent with right ventricular volume overload. Normal wall motion. Grade I diastolic dysfunction with normal LAP. Average E/e' ratio is 10.00.    Right Ventricle: Right ventricle is moderately dilated. Reduced systolic function. RV fractional area change is abnormal.    Mitral Valve: Mildly thickened, at the posterior leaflet.    Tricuspid Valve: Mild to moderate regurgitation. Mildly elevated RVSP, consistent with mild pulmonary hypertension. The estimated RVSP is 40 mmHg.    Right Atrium: Right atrium is mildly dilated.    Image quality is technically difficult. Contrast used: Definity. Technically difficult study with poor endocardial visualization.     Venous duplex 8/15/24:  Interpretation Summary  Show Result Comparison     Acute non-occlusive deep vein thrombosis in the right distal femoral, popliteal,and posterior tibial vein.    No other evidence of deep or superficial venous thrombosis in the remaining right lower extremity.    No evidence of deep vein or superficial vein thrombosis in the left lower extremity. Vessels demonstrate normal compressibility, color filling, and phasic and spontaneous

## 2024-08-18 LAB
BACTERIA BLD CULT ORG #2: NORMAL
BACTERIA BLD CULT: NORMAL
CARDIOLIPIN IGG SER IA-ACNC: <10 GPL
CARDIOLIPIN IGM SER IA-ACNC: <10 MPL

## 2024-08-19 ENCOUNTER — TELEPHONE (OUTPATIENT)
Dept: PULMONOLOGY | Age: 74
End: 2024-08-19

## 2024-08-19 NOTE — TELEPHONE ENCOUNTER
----- Message from Dr. Yimi Charles MD sent at 8/16/2024 12:14 PM EDT -----  He will need follow up with me in 1 month.

## 2024-08-20 ENCOUNTER — TELEPHONE (OUTPATIENT)
Dept: FAMILY MEDICINE CLINIC | Age: 74
End: 2024-08-20

## 2024-08-20 LAB
F2 C.20210G>A GENO BLD/T: NEGATIVE
SPECIMEN SOURCE: NORMAL

## 2024-08-20 NOTE — TELEPHONE ENCOUNTER
Patients wife was calling to speak with Giuliana. She wanted to send a picture of something that happened in the Hospital.    Can we please give her a call

## 2024-08-22 LAB
F5 P.R506Q BLD/T QL: NEGATIVE
SPECIMEN SOURCE: NORMAL

## 2024-08-26 ENCOUNTER — OFFICE VISIT (OUTPATIENT)
Dept: DERMATOLOGY | Age: 74
End: 2024-08-26
Payer: MEDICARE

## 2024-08-26 DIAGNOSIS — L82.1 SEBORRHEIC KERATOSIS: ICD-10-CM

## 2024-08-26 DIAGNOSIS — Z86.018 HISTORY OF DYSPLASTIC NEVUS: ICD-10-CM

## 2024-08-26 DIAGNOSIS — D22.9 MULTIPLE NEVI: ICD-10-CM

## 2024-08-26 DIAGNOSIS — L57.0 AK (ACTINIC KERATOSIS): ICD-10-CM

## 2024-08-26 DIAGNOSIS — Z85.828 HISTORY OF NONMELANOMA SKIN CANCER: Primary | ICD-10-CM

## 2024-08-26 PROCEDURE — 17004 DESTROY PREMAL LESIONS 15/>: CPT | Performed by: DERMATOLOGY

## 2024-08-26 PROCEDURE — G8427 DOCREV CUR MEDS BY ELIG CLIN: HCPCS | Performed by: DERMATOLOGY

## 2024-08-26 PROCEDURE — 1111F DSCHRG MED/CURRENT MED MERGE: CPT | Performed by: DERMATOLOGY

## 2024-08-26 PROCEDURE — 3017F COLORECTAL CA SCREEN DOC REV: CPT | Performed by: DERMATOLOGY

## 2024-08-26 PROCEDURE — 99213 OFFICE O/P EST LOW 20 MIN: CPT | Performed by: DERMATOLOGY

## 2024-08-26 PROCEDURE — 4004F PT TOBACCO SCREEN RCVD TLK: CPT | Performed by: DERMATOLOGY

## 2024-08-26 PROCEDURE — 1123F ACP DISCUSS/DSCN MKR DOCD: CPT | Performed by: DERMATOLOGY

## 2024-08-26 PROCEDURE — G8417 CALC BMI ABV UP PARAM F/U: HCPCS | Performed by: DERMATOLOGY

## 2024-08-26 NOTE — PROGRESS NOTES
[facility] Dermat[facility]  Marisa Mccall MD  [phone]      Raul Warren  1950    74 y.o. male     Date of Visit: 8/26/2024    Chief Complaint: f/u skin cancer and AK's  Chief Complaint   Patient presents with    Skin Exam     Last seen: 7-2023 and     *had a PE this month 8-2024 - on eliquis now    History of Present Illness:    He lives on [address].    1. Here for a full skin check for hx of NMSC.  No probs with previous sites.  New concerns noted below.    2. He has scattered asx brown lesions on the trunk and extremities, present for many years; no change in size/shape/color of any lesions; no bleeding lesions.    3. Hx of dysplastic nevus - lower back (mod dysplastic, removed with bx 4-2021).    4. F/u AK's.  He has scattered rough lesions on the face, shins.      + hx of NMSC - BCC and SCC (R thigh and face), last was ~ 2013  No family hx of melanoma.  He wears sunscreen - plays golf.  Always wears a hat.  Often doesn't reapply.    Review of Systems:  Gen: Feels well, good sense of health.  Skin: No changing moles or lesions.    Past Medical History, Family History, Surgical History, Medications and Allergies reviewed.    Past Medical History:   Diagnosis Date    Actinic keratosis     BPH (benign prostatic hypertrophy)     BPH w/o urinary obs/LUTS     Bulging disc 1990s    lumbar    Chest pain     Contusion of back     Costochondritis 9/7/2011    Degeneration of intervertebral disc, site unspecified     Diarrhea 9/7/2011    Dysphagia, unspecified     Eosinophilic esophagitis [id number]    DR WILLIE MCCRARY, EGD    HCV (hepatitis C virus) 12/1997    Hyperpotassemia     Lumbago     Lumbar strain     acute     Other malignant neoplasm of skin, site unspecified     Pain in joint, shoulder region     Partial Achilles tendon tear     LT     Pharyngitis 9/7/2011    Schatzki's ring [phone]    DR WILLIE MCCRARY, EGD    Screening colonoscopy     colonoscopy (benign polyps) every 5

## 2024-08-29 ENCOUNTER — HOSPITAL ENCOUNTER (OUTPATIENT)
Age: 74
Discharge: HOME | End: 2024-08-31
Payer: MEDICARE

## 2024-08-29 VITALS — WEIGHT: 220 LBS | HEIGHT: 71 IN | BODY MASS INDEX: 30.8 KG/M2

## 2024-08-29 DIAGNOSIS — R06.02 SHORTNESS OF BREATH: ICD-10-CM

## 2024-08-29 LAB
ECHO BSA: 2.24 M2
NUC STRESS EJECTION FRACTION: 53 %
NUC STRESS LV EDV: 78 ML (ref 67–155)
NUC STRESS LV ESV: 37 ML (ref 22–58)
NUC STRESS LV MASS: 110 G
STRESS BASELINE DIAS BP: 64 MMHG
STRESS BASELINE HR: 62 BPM
STRESS BASELINE ST DEPRESSION: 0 MM
STRESS BASELINE SYS BP: 127 MMHG
STRESS ESTIMATED WORKLOAD: 1.9 METS
STRESS EXERCISE DUR MIN: 4 MIN
STRESS EXERCISE DUR SEC: 0 SEC
STRESS O2 SAT PEAK: 98 %
STRESS O2 SAT REST: 98 %
STRESS PEAK DIAS BP: 61 MMHG
STRESS PEAK SYS BP: 162 MMHG
STRESS PERCENT HR ACHIEVED: 71 %
STRESS POST PEAK HR: 105 BPM
STRESS RATE PRESSURE PRODUCT: NORMAL BPM*MMHG
STRESS ST DEPRESSION: 0 MM
STRESS TARGET HR: 147 BPM
TID: 1.02

## 2024-08-29 PROCEDURE — 93017 CV STRESS TEST TRACING ONLY: CPT

## 2024-08-29 PROCEDURE — 6360000002 HC RX W HCPCS: Performed by: FAMILY MEDICINE

## 2024-08-29 PROCEDURE — A9502 TC99M TETROFOSMIN: HCPCS | Performed by: REGISTERED NURSE

## 2024-08-29 PROCEDURE — 78452 HT MUSCLE IMAGE SPECT MULT: CPT | Performed by: INTERNAL MEDICINE

## 2024-08-29 PROCEDURE — 93016 CV STRESS TEST SUPVJ ONLY: CPT | Performed by: INTERNAL MEDICINE

## 2024-08-29 PROCEDURE — 3430000000 HC RX DIAGNOSTIC RADIOPHARMACEUTICAL: Performed by: REGISTERED NURSE

## 2024-08-29 PROCEDURE — 78452 HT MUSCLE IMAGE SPECT MULT: CPT

## 2024-08-29 PROCEDURE — 93018 CV STRESS TEST I&R ONLY: CPT | Performed by: INTERNAL MEDICINE

## 2024-08-29 RX ORDER — REGADENOSON 0.08 MG/ML
0.4 INJECTION, SOLUTION INTRAVENOUS
Status: COMPLETED | OUTPATIENT
Start: 2024-08-29 | End: 2024-08-29

## 2024-08-29 RX ADMIN — REGADENOSON 0.4 MG: 0.08 INJECTION, SOLUTION INTRAVENOUS at 09:37

## 2024-08-29 RX ADMIN — TETROFOSMIN 31.9 MILLICURIE: 1.38 INJECTION, POWDER, LYOPHILIZED, FOR SOLUTION INTRAVENOUS at 09:15

## 2024-08-29 RX ADMIN — TETROFOSMIN 10.3 MILLICURIE: 1.38 INJECTION, POWDER, LYOPHILIZED, FOR SOLUTION INTRAVENOUS at 08:12

## 2024-09-03 ENCOUNTER — OFFICE VISIT (OUTPATIENT)
Dept: FAMILY MEDICINE CLINIC | Age: 74
End: 2024-09-03

## 2024-09-03 VITALS
WEIGHT: 230.2 LBS | HEIGHT: 71 IN | OXYGEN SATURATION: 98 % | SYSTOLIC BLOOD PRESSURE: 120 MMHG | HEART RATE: 92 BPM | BODY MASS INDEX: 32.23 KG/M2 | DIASTOLIC BLOOD PRESSURE: 70 MMHG

## 2024-09-03 DIAGNOSIS — R42 DIZZINESS: ICD-10-CM

## 2024-09-03 DIAGNOSIS — I82.432 ACUTE DEEP VEIN THROMBOSIS (DVT) OF POPLITEAL VEIN OF LEFT LOWER EXTREMITY (HCC): ICD-10-CM

## 2024-09-03 DIAGNOSIS — Z09 HOSPITAL DISCHARGE FOLLOW-UP: Primary | ICD-10-CM

## 2024-09-03 DIAGNOSIS — I26.02 ACUTE SADDLE PULMONARY EMBOLISM WITH ACUTE COR PULMONALE (HCC): ICD-10-CM

## 2024-09-03 DIAGNOSIS — R42 LIGHT HEADEDNESS: ICD-10-CM

## 2024-09-03 ASSESSMENT — ENCOUNTER SYMPTOMS
VOMITING: 0
CONSTIPATION: 0
COUGH: 1
NAUSEA: 0
CHEST TIGHTNESS: 0
WHEEZING: 0
SHORTNESS OF BREATH: 1
DIARRHEA: 0

## 2024-09-03 NOTE — PROGRESS NOTES
Post-Discharge Transitional Care Follow Up      Raul Warren   YOB: 1950    Date of Office Visit:  9/3/2024  Date of Hospital Admission: 8/14/24  Date of Hospital Discharge: 8/16/24  Readmission Risk Score (high >=14%. Medium >=10%):Readmission Risk Score: 6.9    Care management risk score Rising risk (score 2-5) and Complex Care (Scores >=6): No Risk Score On File     Non face to face  following discharge, date last encounter closed (first attempt may have been earlier): 08/20/2024     Call initiated 2 business days of discharge: Yes     Hospital discharge follow-up  -     ND DISCHARGE MEDS RECONCILED W/ CURRENT OUTPATIENT MED LIST    Acute saddle pulmonary embolism with acute cor pulmonale (HCC)  -     Link Redman MD, Hematology/Oncology, [facility]  -     apixaban (ELIQUIS) 5 MG TABS tablet; Take 1 tablet by mouth 2 times daily, Disp-360 tablet, R-0Normal  - Pt to follow-up with Dr. Rangel from hospitalization regarding cause for PE and DVT.     Acute deep vein thrombosis (DVT) of popliteal vein of left lower extremity ([facility])  -     Link Redman MD, Hematology/Oncology, [facility]  -     apixaban (ELIQUIS) 5 MG TABS tablet; Take 1 tablet by mouth 2 times daily, Disp-360 tablet, R-0Normal  -   Dizziness  -     Sandro John MD, Cardiology, [facility]  - Pt's stress test was negative for ischemia which is reassuring. Pt was not dizzy during visit with me today but I did tell him that again, should he have a syncopal episode, he will need to go to the ER.   - Could be related to starting Lisinopril, pt was not on a BP medication previously. Pt was encouraged to take this with food.   - Could also be related to the right heart strain that was caused by the PE. Encouraged pt to exercise as tolerated.   - Encouraged to wear compression socks, examples shown on computer during visit.  - Even though pt had an ECHO done in the hospital, he was instructed to get it done

## 2024-09-05 ENCOUNTER — TELEPHONE (OUTPATIENT)
Dept: CARDIOLOGY CLINIC | Age: 74
End: 2024-09-05

## 2024-09-05 NOTE — TELEPHONE ENCOUNTER
New Patient Referral    Referring Provider Name:  Gianna Poole  Phone Number:  [phone]  Fax Number:  Address:  [address] [place] [address]     Diagnosis/Reason for Visit:  Dizziness/Light headedness     Cardiac Clearance? no    Cardiac Testing: (Yes/No/Unsure)     Date testing was completed?___________      Have records been requested? (Yes/No)    Preferred Language:  English    LM for pt to call and schedule w/1st avail provider

## 2024-09-16 ENCOUNTER — OFFICE VISIT (OUTPATIENT)
Dept: PULMONOLOGY | Age: 74
End: 2024-09-16
Payer: MEDICARE

## 2024-09-16 VITALS
WEIGHT: 230 LBS | HEIGHT: 71 IN | OXYGEN SATURATION: 97 % | DIASTOLIC BLOOD PRESSURE: 60 MMHG | HEART RATE: 83 BPM | BODY MASS INDEX: 32.2 KG/M2 | SYSTOLIC BLOOD PRESSURE: 110 MMHG

## 2024-09-16 DIAGNOSIS — I26.02 ACUTE SADDLE PULMONARY EMBOLISM WITH ACUTE COR PULMONALE (HCC): Primary | ICD-10-CM

## 2024-09-16 PROCEDURE — 3017F COLORECTAL CA SCREEN DOC REV: CPT | Performed by: INTERNAL MEDICINE

## 2024-09-16 PROCEDURE — G8427 DOCREV CUR MEDS BY ELIG CLIN: HCPCS | Performed by: INTERNAL MEDICINE

## 2024-09-16 PROCEDURE — G8417 CALC BMI ABV UP PARAM F/U: HCPCS | Performed by: INTERNAL MEDICINE

## 2024-09-16 PROCEDURE — 1123F ACP DISCUSS/DSCN MKR DOCD: CPT | Performed by: INTERNAL MEDICINE

## 2024-09-16 PROCEDURE — 4004F PT TOBACCO SCREEN RCVD TLK: CPT | Performed by: INTERNAL MEDICINE

## 2024-09-16 PROCEDURE — 99214 OFFICE O/P EST MOD 30 MIN: CPT | Performed by: INTERNAL MEDICINE

## 2024-09-16 ASSESSMENT — ENCOUNTER SYMPTOMS
BACK PAIN: 0
COUGH: 0
SINUS PRESSURE: 0
APNEA: 0
STRIDOR: 0
COLOR CHANGE: 0
CHOKING: 0
CONSTIPATION: 0
RHINORRHEA: 0
ABDOMINAL PAIN: 0
WHEEZING: 0
SORE THROAT: 0
BLOOD IN STOOL: 0
VOMITING: 0
SHORTNESS OF BREATH: 1
ABDOMINAL DISTENTION: 0
VOICE CHANGE: 0
DIARRHEA: 0
CHEST TIGHTNESS: 0

## 2024-09-17 ENCOUNTER — HOSPITAL ENCOUNTER (OUTPATIENT)
Age: 74
Discharge: HOME | End: 2024-09-19

## 2024-09-17 VITALS — BODY MASS INDEX: 32.2 KG/M2 | WEIGHT: 230 LBS | HEIGHT: 71 IN

## 2024-09-17 DIAGNOSIS — I26.02 ACUTE SADDLE PULMONARY EMBOLISM WITH ACUTE COR PULMONALE (HCC): ICD-10-CM

## 2024-09-17 DIAGNOSIS — R06.02 SHORTNESS OF BREATH: ICD-10-CM

## 2024-09-18 LAB
CREAT SERPL-MCNC: 1.1 MG/DL (ref 0.8–1.3)
GFR SERPLBLD CREATININE-BSD FMLA CKD-EPI: 71 ML/MIN/{1.73_M2}

## 2024-10-03 ENCOUNTER — HOSPITAL ENCOUNTER (OUTPATIENT)
Dept: CT IMAGING | Age: 74
Discharge: HOME | End: 2024-10-03
Attending: INTERNAL MEDICINE
Payer: MEDICARE

## 2024-10-03 DIAGNOSIS — I26.02 ACUTE SADDLE PULMONARY EMBOLISM WITH ACUTE COR PULMONALE (HCC): ICD-10-CM

## 2024-10-03 PROCEDURE — 71260 CT THORAX DX C+: CPT

## 2024-10-03 PROCEDURE — 6360000004 HC RX CONTRAST MEDICATION: Performed by: INTERNAL MEDICINE

## 2024-10-03 RX ORDER — IOPAMIDOL 755 MG/ML
75 INJECTION, SOLUTION INTRAVASCULAR
Status: COMPLETED | OUTPATIENT
Start: 2024-10-03 | End: 2024-10-03

## 2024-10-03 RX ADMIN — IOPAMIDOL 75 ML: 755 INJECTION, SOLUTION INTRAVENOUS at 07:07

## 2024-10-04 ENCOUNTER — TELEPHONE (OUTPATIENT)
Dept: CARDIOLOGY CLINIC | Age: 74
End: 2024-10-04

## 2024-10-04 NOTE — TELEPHONE ENCOUNTER
Repeat echo not completed, calling to find out more information on why. OV scheduled with ADM on 10/7.

## 2024-10-04 NOTE — PROGRESS NOTES
caffeine given as a reversal agent. Hemodynamics are adequate for diagnosis. Blood pressure demonstrated a normal response and heart rate demonstrated a normal response to stress.    Cath: none    MRI/EP: none    Thrombectomy  8/15/24  Mechanical aspiration thrombectomy was then performed with complete resolution of the thrombus on completion angiogram. With this then done the flow Treiber was retracted into the main pulmonary artery and redirected into the left pulmonary artery where in a similar manner mechanical aspiration thrombectomy was then performed with complete resolution of the thrombus.     Vasc duplex   8/15/24    Acute non-occlusive deep vein thrombosis in the right distal femoral, popliteal,and posterior tibial vein.    No other evidence of deep or superficial venous thrombosis in the remaining right lower extremity.    No evidence of deep vein or superficial vein thrombosis in the left lower extremity. Vessels demonstrate normal compressibility, color filling, and phasic and spontaneous flow.    CT Chest pulm  10/3/24  IMPRESSION:  Minimal residual chronic/nonocclusive pulmonary emboli in the bilateral lower  lobe subsegmental pulmonary artery branches.    Old notes reviewed  Telemetry reviewd  EKG personally reviewed  CXR personally reviewed  Echo, cath, and medications and labs reviewed  High complexity/medical decision making due to extensive data review, extensive history review, independent review of data  High risk due to acute illness, evaluation of drug-drug interactions, medication management and diagnostic interventions    Assessment  Patient Active Problem List   Diagnosis    Benign enlargement of prostate    Stricture and stenosis of esophagus    Chest pain    Actinic keratosis    Dysphagia    Lumbago    Contusion of back    Hyperpotassemia    Pain in joint, shoulder region    Degeneration of intervertebral disc    Diarrhea    GERD (gastroesophageal reflux disease)    Primary

## 2024-10-07 ENCOUNTER — OFFICE VISIT (OUTPATIENT)
Dept: CARDIOLOGY CLINIC | Age: 74
End: 2024-10-07
Payer: MEDICARE

## 2024-10-07 VITALS
WEIGHT: 230 LBS | SYSTOLIC BLOOD PRESSURE: 126 MMHG | OXYGEN SATURATION: 97 % | HEART RATE: 77 BPM | BODY MASS INDEX: 32.2 KG/M2 | DIASTOLIC BLOOD PRESSURE: 74 MMHG | HEIGHT: 71 IN

## 2024-10-07 DIAGNOSIS — I26.09 ACUTE COR PULMONALE (HCC): ICD-10-CM

## 2024-10-07 DIAGNOSIS — E78.5 HYPERLIPIDEMIA, UNSPECIFIED HYPERLIPIDEMIA TYPE: ICD-10-CM

## 2024-10-07 DIAGNOSIS — I42.9 CARDIOMYOPATHY, UNSPECIFIED TYPE (HCC): ICD-10-CM

## 2024-10-07 DIAGNOSIS — I51.9 RIGHT VENTRICULAR DYSFUNCTION: ICD-10-CM

## 2024-10-07 DIAGNOSIS — Z86.718 HISTORY OF THROMBECTOMY: ICD-10-CM

## 2024-10-07 DIAGNOSIS — I10 PRIMARY HYPERTENSION: ICD-10-CM

## 2024-10-07 DIAGNOSIS — Z98.890 HISTORY OF THROMBECTOMY: ICD-10-CM

## 2024-10-07 DIAGNOSIS — I26.02 ACUTE SADDLE PULMONARY EMBOLISM WITH ACUTE COR PULMONALE (HCC): Primary | ICD-10-CM

## 2024-10-07 PROCEDURE — 99204 OFFICE O/P NEW MOD 45 MIN: CPT | Performed by: INTERNAL MEDICINE

## 2024-10-07 PROCEDURE — 1123F ACP DISCUSS/DSCN MKR DOCD: CPT | Performed by: INTERNAL MEDICINE

## 2024-10-07 PROCEDURE — 3078F DIAST BP <80 MM HG: CPT | Performed by: INTERNAL MEDICINE

## 2024-10-07 PROCEDURE — 3074F SYST BP LT 130 MM HG: CPT | Performed by: INTERNAL MEDICINE

## 2024-10-07 PROCEDURE — 93000 ELECTROCARDIOGRAM COMPLETE: CPT | Performed by: INTERNAL MEDICINE

## 2024-10-07 NOTE — PATIENT INSTRUCTIONS
-- Schedule an Echocardiogram for early November. This is an ultrasound to evaluate the function and structure of your heart.    Follow up with Dr. Sharma in November after your echo              
General Sunscreen Counseling: I recommended a broad spectrum sunscreen with a SPF of 30 or higher.  I explained that SPF 30 sunscreens block approximately 97 percent of the sun's harmful rays.  Sunscreens should be applied at least 15 minutes prior to expected sun exposure and then every 2 hours after that as long as sun exposure continues. If swimming or exercising sunscreen should be reapplied every 45 minutes to an hour after getting wet or sweating.  One ounce, or the equivalent of a shot glass full of sunscreen, is adequate to protect the skin not covered by a bathing suit. I also recommended a lip balm with a sunscreen as well. Sun protective clothing can be used in lieu of sunscreen but must be worn the entire time you are exposed to the sun's rays.
Detail Level: Zone

## 2024-11-11 NOTE — PROGRESS NOTES
[facility]  Cardiology Note  [phone]      Chief Complaint   Patient presents with    Hyperlipidemia    Follow-up     Sob with exertion          History of Present Illness:  Raul Warren is a 74 y.o. patient PMHx acute saddle PE with RV strain s/p thrombectomy, RLE DVT, long tern anticoagulant> Eliquis, HTN, HLD    8/14/24-8/16/24 hospitalized for acute saddle PE with RV strain and right lower extremity DVT. Underwent successful mechanical thrombectomy with vascular, with improvement in symptoms. He was discharged on Eliquis with hypercoagulable workup. Plan to follow up with heme-onc as outpatient.    Patient had hospital follow up with PCP and Pulm. Reported symptoms of dizziness with bending over and at times unsteady on his feet. 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     Patient referred for dizziness and light headedness by Gianna Poole CNP. I have been asked to provide consultation regarding further management and testing.    10/7/24 OV: patient states he took a round trip to [place]. A couple weeks later he started to experience right leg pain, then had an episode of SOB while walking into the [employer] center. A syncopal episode at home is what led him to the ED for treatment of his DVT and PE.    He reports the pain in his right leg is gone. Still feels short of breath, does not feel like he can take good deep breath without working hard. Feels tired, does not have the energy he once had.    Today, patient is here for 1 month follow up after completion of his repeat echocardiogram. Continues to feel SOB, but does not keep him from his ADLs, playing golf and running around with his grandkid. He does not feel back to normal. Has a dry hacky cough at times. Reports one episode of hematuria, no difficulty with bruising.     Past Medical History:   has a past medical history of Actinic

## 2024-11-21 ENCOUNTER — HOSPITAL ENCOUNTER (OUTPATIENT)
Age: 74
Discharge: HOME | End: 2024-11-21
Payer: MEDICARE

## 2024-11-21 ENCOUNTER — OFFICE VISIT (OUTPATIENT)
Dept: CARDIOLOGY CLINIC | Age: 74
End: 2024-11-21
Payer: MEDICARE

## 2024-11-21 VITALS
WEIGHT: 236.3 LBS | HEART RATE: 65 BPM | OXYGEN SATURATION: 99 % | HEIGHT: 71 IN | DIASTOLIC BLOOD PRESSURE: 78 MMHG | BODY MASS INDEX: 33.08 KG/M2 | SYSTOLIC BLOOD PRESSURE: 130 MMHG

## 2024-11-21 DIAGNOSIS — I51.9 RIGHT VENTRICULAR DYSFUNCTION: ICD-10-CM

## 2024-11-21 DIAGNOSIS — R31.9 HEMATURIA, UNSPECIFIED TYPE: ICD-10-CM

## 2024-11-21 DIAGNOSIS — I10 PRIMARY HYPERTENSION: ICD-10-CM

## 2024-11-21 DIAGNOSIS — I82.411 ACUTE DEEP VEIN THROMBOSIS (DVT) OF FEMORAL VEIN OF RIGHT LOWER EXTREMITY (HCC): ICD-10-CM

## 2024-11-21 DIAGNOSIS — I26.02 ACUTE SADDLE PULMONARY EMBOLISM WITH ACUTE COR PULMONALE (HCC): Primary | ICD-10-CM

## 2024-11-21 LAB
BILIRUB UR QL STRIP.AUTO: NEGATIVE
CLARITY UR: CLEAR
COLOR UR: YELLOW
DEPRECATED RDW RBC AUTO: 12.8 % (ref 12.4–15.4)
GLUCOSE UR STRIP.AUTO-MCNC: NEGATIVE MG/DL
HCT VFR BLD AUTO: 44.5 % (ref 40.5–52.5)
HGB BLD-MCNC: 15.1 G/DL (ref 13.5–17.5)
HGB UR QL STRIP.AUTO: NEGATIVE
KETONES UR STRIP.AUTO-MCNC: ABNORMAL MG/DL
LEUKOCYTE ESTERASE UR QL STRIP.AUTO: NEGATIVE
MCH RBC QN AUTO: 32 PG (ref 26–34)
MCHC RBC AUTO-ENTMCNC: 33.9 G/DL (ref 31–36)
MCV RBC AUTO: 94.5 FL (ref 80–100)
NITRITE UR QL STRIP.AUTO: NEGATIVE
PH UR STRIP.AUTO: 5.5 [PH] (ref 5–8)
PLATELET # BLD AUTO: 249 K/UL (ref 135–450)
PMV BLD AUTO: 9.5 FL (ref 5–10.5)
PROT UR STRIP.AUTO-MCNC: NEGATIVE MG/DL
RBC # BLD AUTO: 4.71 M/UL (ref 4.2–5.9)
SP GR UR STRIP.AUTO: 1.02 (ref 1–1.03)
UA DIPSTICK W REFLEX MICRO PNL UR: ABNORMAL
URN SPEC COLLECT METH UR: ABNORMAL
UROBILINOGEN UR STRIP-ACNC: 0.2 E.U./DL
WBC # BLD AUTO: 7.6 K/UL (ref 4–11)

## 2024-11-21 PROCEDURE — 3017F COLORECTAL CA SCREEN DOC REV: CPT | Performed by: INTERNAL MEDICINE

## 2024-11-21 PROCEDURE — 3075F SYST BP GE 130 - 139MM HG: CPT | Performed by: INTERNAL MEDICINE

## 2024-11-21 PROCEDURE — G8484 FLU IMMUNIZE NO ADMIN: HCPCS | Performed by: INTERNAL MEDICINE

## 2024-11-21 PROCEDURE — 36415 COLL VENOUS BLD VENIPUNCTURE: CPT

## 2024-11-21 PROCEDURE — 85027 COMPLETE CBC AUTOMATED: CPT

## 2024-11-21 PROCEDURE — 81003 URINALYSIS AUTO W/O SCOPE: CPT

## 2024-11-21 PROCEDURE — 3078F DIAST BP <80 MM HG: CPT | Performed by: INTERNAL MEDICINE

## 2024-11-21 PROCEDURE — 4004F PT TOBACCO SCREEN RCVD TLK: CPT | Performed by: INTERNAL MEDICINE

## 2024-11-21 PROCEDURE — 1159F MED LIST DOCD IN RCRD: CPT | Performed by: INTERNAL MEDICINE

## 2024-11-21 PROCEDURE — G8427 DOCREV CUR MEDS BY ELIG CLIN: HCPCS | Performed by: INTERNAL MEDICINE

## 2024-11-21 PROCEDURE — 1123F ACP DISCUSS/DSCN MKR DOCD: CPT | Performed by: INTERNAL MEDICINE

## 2024-11-21 PROCEDURE — 99214 OFFICE O/P EST MOD 30 MIN: CPT | Performed by: INTERNAL MEDICINE

## 2024-11-21 PROCEDURE — G8417 CALC BMI ABV UP PARAM F/U: HCPCS | Performed by: INTERNAL MEDICINE

## 2024-11-21 NOTE — PATIENT INSTRUCTIONS
- Your heart has recovered based on your recent testing    - Have your blood work completed today    - Exercise regularly  Approximately 30 minutes/day, 5 times/week  Exercising is considered anything that makes you break a sweat    - Follow up with Dr. Sharma in 6 months

## 2024-12-10 ENCOUNTER — OFFICE VISIT (OUTPATIENT)
Dept: PULMONOLOGY | Age: 74
End: 2024-12-10
Payer: MEDICARE

## 2024-12-10 VITALS
HEART RATE: 86 BPM | SYSTOLIC BLOOD PRESSURE: 122 MMHG | HEIGHT: 71 IN | WEIGHT: 235.2 LBS | DIASTOLIC BLOOD PRESSURE: 72 MMHG | OXYGEN SATURATION: 98 % | BODY MASS INDEX: 32.93 KG/M2

## 2024-12-10 DIAGNOSIS — I27.82 OTHER CHRONIC PULMONARY EMBOLISM WITHOUT ACUTE COR PULMONALE (HCC): ICD-10-CM

## 2024-12-10 DIAGNOSIS — Z86.711 HISTORY OF PULMONARY EMBOLISM: Primary | ICD-10-CM

## 2024-12-10 PROCEDURE — 1159F MED LIST DOCD IN RCRD: CPT | Performed by: INTERNAL MEDICINE

## 2024-12-10 PROCEDURE — 99214 OFFICE O/P EST MOD 30 MIN: CPT | Performed by: INTERNAL MEDICINE

## 2024-12-10 PROCEDURE — G8484 FLU IMMUNIZE NO ADMIN: HCPCS | Performed by: INTERNAL MEDICINE

## 2024-12-10 PROCEDURE — 4004F PT TOBACCO SCREEN RCVD TLK: CPT | Performed by: INTERNAL MEDICINE

## 2024-12-10 PROCEDURE — G8417 CALC BMI ABV UP PARAM F/U: HCPCS | Performed by: INTERNAL MEDICINE

## 2024-12-10 PROCEDURE — 3017F COLORECTAL CA SCREEN DOC REV: CPT | Performed by: INTERNAL MEDICINE

## 2024-12-10 PROCEDURE — 1123F ACP DISCUSS/DSCN MKR DOCD: CPT | Performed by: INTERNAL MEDICINE

## 2024-12-10 PROCEDURE — G8427 DOCREV CUR MEDS BY ELIG CLIN: HCPCS | Performed by: INTERNAL MEDICINE

## 2024-12-10 ASSESSMENT — ENCOUNTER SYMPTOMS
STRIDOR: 0
BACK PAIN: 0
CHEST TIGHTNESS: 0
COLOR CHANGE: 0
DIARRHEA: 0
CONSTIPATION: 0
SINUS PRESSURE: 0
COUGH: 0
APNEA: 0
SHORTNESS OF BREATH: 1
VOMITING: 0
CHOKING: 0
WHEEZING: 0
ABDOMINAL PAIN: 0
BLOOD IN STOOL: 0
SORE THROAT: 0
RHINORRHEA: 0
VOICE CHANGE: 0
ABDOMINAL DISTENTION: 0

## 2024-12-10 NOTE — PROGRESS NOTES
status post mechanical thrombectomy on 8/15.  Excellent response to treatment.  Patient also was noted to have right lower extremity DVT.  Unprovoked and life-threatening PE, plan for lifelong anticoagulation.    2D echo from 11/4 revealed normal EF and RV size/function.  There is resolution of the previously noted RV dilation.    CTA chest from 10/3 revealed minimal nonocclusive PE in both lower lobe subsegmental pulmonary arteries.  This is suggestive of chronic PE, since patient is minimally symptomatic with a normal 2D echo no further investigations would be necessary.  Patient will continue on lifelong anticoagulation, currently on Eliquis 5 mg twice daily.  Patient states that he occasionally misses a dose once a week, which I have cautioned against.      I have recommended to him to contact his PCP for further refills.  Pulmonary will only follow if necessary.  Please contact if any concerns.    No follow-ups on file.

## 2024-12-16 ENCOUNTER — TELEPHONE (OUTPATIENT)
Dept: FAMILY MEDICINE CLINIC | Age: 74
End: 2024-12-16

## 2024-12-16 ENCOUNTER — TELEMEDICINE (OUTPATIENT)
Dept: FAMILY MEDICINE CLINIC | Age: 74
End: 2024-12-16

## 2024-12-16 DIAGNOSIS — R05.1 ACUTE COUGH: Primary | ICD-10-CM

## 2024-12-16 DIAGNOSIS — U07.1 COVID-19: ICD-10-CM

## 2024-12-16 RX ORDER — BENZONATATE 100 MG/1
100-200 CAPSULE ORAL 3 TIMES DAILY PRN
Qty: 21 CAPSULE | Refills: 0 | Status: SHIPPED | OUTPATIENT
Start: 2024-12-16 | End: 2024-12-23

## 2024-12-16 NOTE — TELEPHONE ENCOUNTER
Patient was calling because he Has tested Positive for Covid. He thought he just had a cold but his wife was worse than him and he had her test so he did too.    PT C/O NASAL DRAINAGE: yes   WHAT COLOR: clear    COUGH: yes   BRINGING UP PHLEGM:  yes, a little  IF YES, WHAT COLOR:   Possibly milky in color   SORE THROAT:  yes  PND:  yes  HEADACHE:  no  EAR PAIN:  no but feel Clogged   LEFT, RIGHT OR BOTH:  Both off and on   S.O.B.:  no  WITH OR WITHOUT EXERTION:    WHEEZING:  no   HEAD CONGESTION:  yes  CHEST CONGESTION:  yes  BODY ACHES:  yes  VOMITTING no  DIARRHEA:  yes, 1 episode   TEMP:  no   IF SO, HIGHEST TEMP:    SYMPTOMS FOR HOW MANY DAYS:  Started feeling bad Last Tuesday but just seems like a cold   WHAT MEDS HAS PT TRIED:  Mucin\ex and Nyquil, cough drops, Tylenol     VERIFY PHARMACY INFORMATION:    [facility] Pharmacy Phone number   He asked if it was to late for Paxlovid.   Can we give him a call

## 2024-12-16 NOTE — PROGRESS NOTES
Documentation:  I communicated with the patient and/or health care decision maker about COVID-19 and cough.     Pt tested for COVID-19 this morning. Pt's wife also recently tested positive as well.   Symptoms started last Tuesday with cough and nasal congestion. Pt reports the cough is dry and hacking, non-productive. Pt reports having a \"dry-tickle\" in the back of his throat that causes him to cough.   Pt reports that overall, he hasn't been feeling \"all that terrible.\"  Additionally, pt reports his throat has also been sore this morning.   Pt reports that over the weekend, he had malaise, but that has since resolved.    Pt denies having fevers.     Details of this discussion including any medical advice provided:   - pt's symptoms started 6 days ago, outside of Paxlovid therapeutic window, pt verbalized understanding regarding this.   - Shay Blancas ordered, r/b/se of medications discussed with pt who verbalized understanding. Pt was instructed to take this while his cough remained non-productive but pt was advised to take Mucinex (non-DM) to help thin secretions and cough up phlegm.  - Symptoms management with DayQuil/NyQuil.  - Stay hydrated with at least 64 ounces of water daily.  - pt was encouraged to get plenty of rest, minimally 8 hours per night.  - pt was instructed to contact ofice by end of week if his ss do not improve.        Total Time: minutes: 11-20 minutes    Raul Warren was evaluated through a synchronous (real-time) audio encounter. Patient identification was verified at the start of the visit. He (or guardian if applicable) is aware that this is a billable service, which includes applicable co-pays. This visit was conducted with the patient's (and/or legal guardian's) verbal consent. He has not had a related appointment within my department in the past 7 days or scheduled within the next 24 hours.   The patient was located at Home: [address] [place] [address].  The provider

## 2024-12-16 NOTE — TELEPHONE ENCOUNTER
PER STEVEN, OK TO MAKE PATIENT A VIRTUAL AT 4:45PM. CALLED AND SPOKE TO PATIENT AND PUT HIM ON THE SCHEDULE. JAGJIT WILL GET HIM ROOMED FOR HER. SC

## 2025-01-11 DIAGNOSIS — R06.02 SHORTNESS OF BREATH: ICD-10-CM

## 2025-01-11 DIAGNOSIS — I10 HYPERTENSION, UNSPECIFIED TYPE: ICD-10-CM

## 2025-01-11 DIAGNOSIS — R07.9 INTERMITTENT CHEST PAIN: ICD-10-CM

## 2025-01-12 DIAGNOSIS — R06.02 SHORTNESS OF BREATH: ICD-10-CM

## 2025-01-12 DIAGNOSIS — I10 HYPERTENSION, UNSPECIFIED TYPE: ICD-10-CM

## 2025-01-12 DIAGNOSIS — R07.9 INTERMITTENT CHEST PAIN: ICD-10-CM

## 2025-01-13 RX ORDER — LISINOPRIL 5 MG/1
5 TABLET ORAL DAILY
Qty: 30 TABLET | OUTPATIENT
Start: 2025-01-13

## 2025-01-13 RX ORDER — LISINOPRIL 5 MG/1
5 TABLET ORAL DAILY
Qty: 90 TABLET | Refills: 0 | Status: SHIPPED | OUTPATIENT
Start: 2025-01-13

## 2025-03-04 DIAGNOSIS — I82.432 ACUTE DEEP VEIN THROMBOSIS (DVT) OF POPLITEAL VEIN OF LEFT LOWER EXTREMITY (HCC): ICD-10-CM

## 2025-03-04 DIAGNOSIS — I26.02 ACUTE SADDLE PULMONARY EMBOLISM WITH ACUTE COR PULMONALE (HCC): ICD-10-CM

## 2025-03-04 RX ORDER — APIXABAN 5 MG/1
5 TABLET, FILM COATED ORAL 2 TIMES DAILY
Qty: 360 TABLET | Refills: 0 | Status: SHIPPED | OUTPATIENT
Start: 2025-03-04

## 2025-03-20 ENCOUNTER — TELEPHONE (OUTPATIENT)
Dept: FAMILY MEDICINE CLINIC | Age: 75
End: 2025-03-20

## 2025-03-20 DIAGNOSIS — Z12.5 SCREENING PSA (PROSTATE SPECIFIC ANTIGEN): ICD-10-CM

## 2025-03-20 DIAGNOSIS — R35.1 BENIGN PROSTATIC HYPERPLASIA WITH NOCTURIA: Primary | ICD-10-CM

## 2025-03-20 DIAGNOSIS — R97.20 ELEVATED PSA: ICD-10-CM

## 2025-03-20 DIAGNOSIS — R73.9 HYPERGLYCEMIA: ICD-10-CM

## 2025-03-20 DIAGNOSIS — N40.1 BENIGN PROSTATIC HYPERPLASIA WITH NOCTURIA: Primary | ICD-10-CM

## 2025-03-20 NOTE — TELEPHONE ENCOUNTER
Pt is calling to get orders for a   PSA Screening -levels   A1C    He will go to a [facility] facility -not here.  He will go on Monday.

## 2025-03-27 DIAGNOSIS — N40.1 BENIGN PROSTATIC HYPERPLASIA WITH NOCTURIA: ICD-10-CM

## 2025-03-27 DIAGNOSIS — Z12.5 SCREENING PSA (PROSTATE SPECIFIC ANTIGEN): ICD-10-CM

## 2025-03-27 DIAGNOSIS — R73.9 HYPERGLYCEMIA: ICD-10-CM

## 2025-03-27 DIAGNOSIS — R97.20 ELEVATED PSA: ICD-10-CM

## 2025-03-27 DIAGNOSIS — R07.9 INTERMITTENT CHEST PAIN: ICD-10-CM

## 2025-03-27 DIAGNOSIS — R35.1 BENIGN PROSTATIC HYPERPLASIA WITH NOCTURIA: ICD-10-CM

## 2025-03-27 DIAGNOSIS — R06.02 SHORTNESS OF BREATH: ICD-10-CM

## 2025-03-27 LAB
D-DIMER QUANTITATIVE: <0.27 UG/ML FEU (ref 0–0.6)
EST. AVERAGE GLUCOSE BLD GHB EST-MCNC: 131.2 MG/DL
HBA1C MFR BLD: 6.2 %
NT-PROBNP SERPL-MCNC: 125 PG/ML (ref 0–449)
PSA SERPL DL<=0.01 NG/ML-MCNC: 5.15 NG/ML (ref 0–4)

## 2025-03-28 ENCOUNTER — RESULTS FOLLOW-UP (OUTPATIENT)
Dept: FAMILY MEDICINE CLINIC | Age: 75
End: 2025-03-28

## 2025-04-12 DIAGNOSIS — R06.02 SHORTNESS OF BREATH: ICD-10-CM

## 2025-04-12 DIAGNOSIS — R07.9 INTERMITTENT CHEST PAIN: ICD-10-CM

## 2025-04-12 DIAGNOSIS — I10 HYPERTENSION, UNSPECIFIED TYPE: ICD-10-CM

## 2025-04-14 ENCOUNTER — TELEPHONE (OUTPATIENT)
Dept: FAMILY MEDICINE CLINIC | Age: 75
End: 2025-04-14

## 2025-04-14 RX ORDER — LISINOPRIL 5 MG/1
5 TABLET ORAL DAILY
Qty: 90 TABLET | Refills: 0 | Status: SHIPPED | OUTPATIENT
Start: 2025-04-14 | End: 2025-04-15 | Stop reason: SINTOL

## 2025-04-14 ASSESSMENT — PATIENT HEALTH QUESTIONNAIRE - PHQ9
SUM OF ALL RESPONSES TO PHQ9 QUESTIONS 1 & 2: 0
SUM OF ALL RESPONSES TO PHQ QUESTIONS 1-9: 0
2. FEELING DOWN, DEPRESSED OR HOPELESS: NOT AT ALL
SUM OF ALL RESPONSES TO PHQ QUESTIONS 1-9: 0
1. LITTLE INTEREST OR PLEASURE IN DOING THINGS: NOT AT ALL
1. LITTLE INTEREST OR PLEASURE IN DOING THINGS: NOT AT ALL
2. FEELING DOWN, DEPRESSED OR HOPELESS: NOT AT ALL
SUM OF ALL RESPONSES TO PHQ QUESTIONS 1-9: 0
SUM OF ALL RESPONSES TO PHQ QUESTIONS 1-9: 0

## 2025-04-14 NOTE — TELEPHONE ENCOUNTER
Patient called in stating he believes he is allergic to the LISINOPRIL. He has had a scratchy cough going on since starting this medication and states he believes he's allergic because it is an ace inhibitor. He would like something else to be prescribed for him.     He would like to know when his last colonoscopy was.     [facility]  [address]  Phone no. [phone]    Please give him a call back.

## 2025-04-15 ENCOUNTER — OFFICE VISIT (OUTPATIENT)
Dept: FAMILY MEDICINE CLINIC | Age: 75
End: 2025-04-15
Payer: MEDICARE

## 2025-04-15 VITALS
HEART RATE: 71 BPM | SYSTOLIC BLOOD PRESSURE: 156 MMHG | HEIGHT: 71 IN | WEIGHT: 239 LBS | OXYGEN SATURATION: 98 % | DIASTOLIC BLOOD PRESSURE: 78 MMHG | BODY MASS INDEX: 33.46 KG/M2

## 2025-04-15 DIAGNOSIS — I10 PRIMARY HYPERTENSION: Primary | ICD-10-CM

## 2025-04-15 DIAGNOSIS — J96.01 ACUTE RESPIRATORY FAILURE WITH HYPOXIA: ICD-10-CM

## 2025-04-15 DIAGNOSIS — R42 DIZZINESS: ICD-10-CM

## 2025-04-15 DIAGNOSIS — I42.9 CARDIOMYOPATHY, UNSPECIFIED TYPE (HCC): ICD-10-CM

## 2025-04-15 PROCEDURE — 4004F PT TOBACCO SCREEN RCVD TLK: CPT | Performed by: REGISTERED NURSE

## 2025-04-15 PROCEDURE — G8417 CALC BMI ABV UP PARAM F/U: HCPCS | Performed by: REGISTERED NURSE

## 2025-04-15 PROCEDURE — 99214 OFFICE O/P EST MOD 30 MIN: CPT | Performed by: REGISTERED NURSE

## 2025-04-15 PROCEDURE — 3074F SYST BP LT 130 MM HG: CPT | Performed by: REGISTERED NURSE

## 2025-04-15 PROCEDURE — 3017F COLORECTAL CA SCREEN DOC REV: CPT | Performed by: REGISTERED NURSE

## 2025-04-15 PROCEDURE — 1159F MED LIST DOCD IN RCRD: CPT | Performed by: REGISTERED NURSE

## 2025-04-15 PROCEDURE — 1123F ACP DISCUSS/DSCN MKR DOCD: CPT | Performed by: REGISTERED NURSE

## 2025-04-15 PROCEDURE — 1160F RVW MEDS BY RX/DR IN RCRD: CPT | Performed by: REGISTERED NURSE

## 2025-04-15 PROCEDURE — G8427 DOCREV CUR MEDS BY ELIG CLIN: HCPCS | Performed by: REGISTERED NURSE

## 2025-04-15 PROCEDURE — 3078F DIAST BP <80 MM HG: CPT | Performed by: REGISTERED NURSE

## 2025-04-15 RX ORDER — FINASTERIDE 5 MG/1
5 TABLET, FILM COATED ORAL DAILY
COMMUNITY
Start: 2025-04-02

## 2025-04-15 RX ORDER — LOSARTAN POTASSIUM 25 MG/1
25 TABLET ORAL DAILY
Qty: 90 TABLET | Refills: 1 | Status: SHIPPED | OUTPATIENT
Start: 2025-04-15

## 2025-04-15 RX ORDER — TAMSULOSIN HYDROCHLORIDE 0.4 MG/1
0.4 CAPSULE ORAL DAILY
COMMUNITY
Start: 2025-04-02

## 2025-04-15 ASSESSMENT — ENCOUNTER SYMPTOMS
SHORTNESS OF BREATH: 0
COUGH: 0
VOMITING: 0
BACK PAIN: 0
ABDOMINAL PAIN: 0
DIARRHEA: 0
CONSTIPATION: 0
NAUSEA: 0
WHEEZING: 0
CHEST TIGHTNESS: 0

## 2025-04-15 NOTE — PROGRESS NOTES
Raul Warren (:  1950) is a 75 y.o. male,Established patient, here for evaluation of the following chief complaint(s):  Hypertension (FOLLOW UP ON BLOOD PRESSURE WANTS TO DISCUSS CHANGING MEDICATION DUE TO COUGH )       Assessment & Plan  Primary hypertension   Chronic, not at goal (unstable), will switch to Losartan because of cough s/e that pt is experiencing.   - Pt was sent home with an office BP cuff, advised to return in 2 weeks for f/u.  -DASH and Mediterranean diets discussed with pt. I also encouraged him to exercise, consider joining [employer] to help with exercise.   Orders:    losartan (COZAAR) 25 MG tablet; Take 1 tablet by mouth daily    Acute respiratory failure with hypoxia   Chronic, at goal (stable), continue current treatment plan  Resolved.       Cardiomyopathy, unspecified type (HCC)   Chronic, at goal (stable), continue management with cardiology.          Dizziness   Chronic, not at goal (unstable), will order blood work although I believe that pt's dizziness is multifactorial. Will change Lisinopril to Losartan and f/u in 2 weeks to review his BP readings and discuss dizziness furthermore. Additionally, will order Doppler of carotid arteries.      Orders:    Vascular duplex carotid bilateral; Future    CBC with Auto Differential; Future    Comprehensive Metabolic Panel; Future      No follow-ups on file.       Subjective   HPI    Pt is here for BP f/u.  Pt was checking his BP yesterday, checked it 3x, said his SBP was >150. Prior to this, pt has not been checking his BP. I rechecked his BP, it was 156/78. Pt endorses compliance with his Lisinopril 5 mg daily but reports he's developed a cough ever since starting this medication.   Pt has not been exercising, has not been working on improving his diet.     Additionally, pt is still having some dizziness when he goes to a standing position from bending over. Pt reports his SOBOE has improved significantly since he has

## 2025-04-18 ENCOUNTER — TELEPHONE (OUTPATIENT)
Dept: CARDIOLOGY CLINIC | Age: 75
End: 2025-04-18

## 2025-04-18 NOTE — TELEPHONE ENCOUNTER
Cardiac clearance received from Urology group. Called Urology group to clarify what procedure patient is having done, unable to read on clearance form. Awaiting return call in order for ADM to review.

## 2025-04-23 NOTE — PROGRESS NOTES
[facility]  Cardiology Note  [phone]      Chief Complaint   Patient presents with    6 Month Follow-Up     No c/c        History of Present Illness:  Raul Warren is a 75 y.o. patient PMHx acute saddle PE with RV strain s/p thrombectomy, RLE DVT, long tern anticoagulant> Eliquis, HTN, HLD    8/14/24-8/16/24 hospitalized for acute saddle PE with RV strain and right lower extremity DVT. Underwent successful mechanical thrombectomy with vascular, with improvement in symptoms. He was discharged on Eliquis with hypercoagulable workup. Plan to follow up with heme-onc as outpatient.    Patient had hospital follow up with PCP and Pulm. Reported symptoms of dizziness with bending over and at times unsteady on his feet. 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     Patient referred for dizziness and light headedness by Gianna Poole CNP. I have been asked to provide consultation regarding further management and testing.    10/7/24 OV: patient states he took a round trip to [place]. A couple weeks later he started to experience right leg pain, then had an episode of SOB while walking into the [employer] center. A syncopal episode at home is what led him to the ED for treatment of his DVT and PE.    He reports the pain in his right leg is gone. Still feels short of breath, does not feel like he can take good deep breath without working hard. Feels tired, does not have the energy he once had.    11/21/24 OV: Continues to feel SOB, but does not keep him from his ADLs, playing golf and running around with his grandkid. He does not feel back to normal. Has a dry hacky cough at times. Reports one episode of hematuria, no difficulty with bruising.     Patient reported to his PCP on 4/15/25 that he is experiencing cough with lisinopril. He was switched to losartan 25.     Today, patient is here for 6 month follow

## 2025-04-28 ENCOUNTER — TELEPHONE (OUTPATIENT)
Dept: CARDIOLOGY CLINIC | Age: 75
End: 2025-04-28

## 2025-04-28 ENCOUNTER — RESULTS FOLLOW-UP (OUTPATIENT)
Dept: FAMILY MEDICINE CLINIC | Age: 75
End: 2025-04-28

## 2025-04-28 ENCOUNTER — OFFICE VISIT (OUTPATIENT)
Dept: FAMILY MEDICINE CLINIC | Age: 75
End: 2025-04-28
Payer: MEDICARE

## 2025-04-28 VITALS
OXYGEN SATURATION: 99 % | HEIGHT: 71 IN | SYSTOLIC BLOOD PRESSURE: 144 MMHG | DIASTOLIC BLOOD PRESSURE: 80 MMHG | WEIGHT: 29 LBS | HEART RATE: 70 BPM | BODY MASS INDEX: 4.06 KG/M2

## 2025-04-28 DIAGNOSIS — S29.019A STRAIN OF MUSCLE AT THORAX LEVEL: ICD-10-CM

## 2025-04-28 DIAGNOSIS — E78.5 HYPERLIPIDEMIA, UNSPECIFIED HYPERLIPIDEMIA TYPE: ICD-10-CM

## 2025-04-28 DIAGNOSIS — I10 PRIMARY HYPERTENSION: Primary | ICD-10-CM

## 2025-04-28 DIAGNOSIS — E87.5 HYPERKALEMIA: Primary | ICD-10-CM

## 2025-04-28 LAB
ALBUMIN SERPL-MCNC: 4.3 G/DL (ref 3.4–5)
ALBUMIN/GLOB SERPL: 1.8 {RATIO} (ref 1.1–2.2)
ALP SERPL-CCNC: 61 U/L (ref 40–129)
ALT SERPL-CCNC: 28 U/L (ref 10–40)
ANION GAP SERPL CALCULATED.3IONS-SCNC: 8 MMOL/L (ref 3–16)
AST SERPL-CCNC: 23 U/L (ref 15–37)
BASOPHILS # BLD: 0 K/UL (ref 0–0.2)
BASOPHILS NFR BLD: 0.5 %
BILIRUB SERPL-MCNC: 0.5 MG/DL (ref 0–1)
BUN SERPL-MCNC: 11 MG/DL (ref 7–20)
CALCIUM SERPL-MCNC: 9.6 MG/DL (ref 8.3–10.6)
CHLORIDE SERPL-SCNC: 101 MMOL/L (ref 99–110)
CO2 SERPL-SCNC: 28 MMOL/L (ref 21–32)
CREAT SERPL-MCNC: 1 MG/DL (ref 0.8–1.3)
DEPRECATED RDW RBC AUTO: 13 % (ref 12.4–15.4)
EOSINOPHIL # BLD: 0.2 K/UL (ref 0–0.6)
EOSINOPHIL NFR BLD: 3 %
GFR SERPLBLD CREATININE-BSD FMLA CKD-EPI: 79 ML/MIN/{1.73_M2}
GLUCOSE SERPL-MCNC: 120 MG/DL (ref 70–99)
HCT VFR BLD AUTO: 44.5 % (ref 40.5–52.5)
HGB BLD-MCNC: 15.2 G/DL (ref 13.5–17.5)
LYMPHOCYTES # BLD: 1.6 K/UL (ref 1–5.1)
LYMPHOCYTES NFR BLD: 28.3 %
MCH RBC QN AUTO: 31.5 PG (ref 26–34)
MCHC RBC AUTO-ENTMCNC: 34.2 G/DL (ref 31–36)
MCV RBC AUTO: 92.3 FL (ref 80–100)
MONOCYTES # BLD: 0.6 K/UL (ref 0–1.3)
MONOCYTES NFR BLD: 11.2 %
NEUTROPHILS # BLD: 3.1 K/UL (ref 1.7–7.7)
NEUTROPHILS NFR BLD: 57 %
PLATELET # BLD AUTO: 237 K/UL (ref 135–450)
PMV BLD AUTO: 9.2 FL (ref 5–10.5)
POTASSIUM SERPL-SCNC: 5.4 MMOL/L (ref 3.5–5.1)
PROT SERPL-MCNC: 6.7 G/DL (ref 6.4–8.2)
RBC # BLD AUTO: 4.82 M/UL (ref 4.2–5.9)
SODIUM SERPL-SCNC: 137 MMOL/L (ref 136–145)
WBC # BLD AUTO: 5.5 K/UL (ref 4–11)

## 2025-04-28 PROCEDURE — 1123F ACP DISCUSS/DSCN MKR DOCD: CPT | Performed by: REGISTERED NURSE

## 2025-04-28 PROCEDURE — 1160F RVW MEDS BY RX/DR IN RCRD: CPT | Performed by: REGISTERED NURSE

## 2025-04-28 PROCEDURE — 3079F DIAST BP 80-89 MM HG: CPT | Performed by: REGISTERED NURSE

## 2025-04-28 PROCEDURE — 3075F SYST BP GE 130 - 139MM HG: CPT | Performed by: REGISTERED NURSE

## 2025-04-28 PROCEDURE — 4004F PT TOBACCO SCREEN RCVD TLK: CPT | Performed by: REGISTERED NURSE

## 2025-04-28 PROCEDURE — G8427 DOCREV CUR MEDS BY ELIG CLIN: HCPCS | Performed by: REGISTERED NURSE

## 2025-04-28 PROCEDURE — 1159F MED LIST DOCD IN RCRD: CPT | Performed by: REGISTERED NURSE

## 2025-04-28 PROCEDURE — 3017F COLORECTAL CA SCREEN DOC REV: CPT | Performed by: REGISTERED NURSE

## 2025-04-28 PROCEDURE — 99214 OFFICE O/P EST MOD 30 MIN: CPT | Performed by: REGISTERED NURSE

## 2025-04-28 PROCEDURE — 36415 COLL VENOUS BLD VENIPUNCTURE: CPT | Performed by: REGISTERED NURSE

## 2025-04-28 PROCEDURE — G8418 CALC BMI BLW LOW PARAM F/U: HCPCS | Performed by: REGISTERED NURSE

## 2025-04-28 RX ORDER — LOSARTAN POTASSIUM 50 MG/1
50 TABLET ORAL DAILY
Qty: 90 TABLET | Refills: 1 | Status: SHIPPED | OUTPATIENT
Start: 2025-04-28 | End: 2025-10-25

## 2025-04-28 RX ORDER — METHYLPREDNISOLONE 4 MG/1
TABLET ORAL
Qty: 21 TABLET | Refills: 0 | Status: SHIPPED | OUTPATIENT
Start: 2025-04-28 | End: 2025-05-04

## 2025-04-28 ASSESSMENT — ENCOUNTER SYMPTOMS
SHORTNESS OF BREATH: 0
COUGH: 0
CHEST TIGHTNESS: 0
WHEEZING: 0
DIARRHEA: 0
BACK PAIN: 1
VOMITING: 0
CONSTIPATION: 0
NAUSEA: 0
ABDOMINAL PAIN: 0

## 2025-04-28 NOTE — PROGRESS NOTES
some soreness in that area. Eventually, the pain had improved but was playing golf yesterday and feels a pain a lot today. Reports that stretching and twisting/sudden movements makes his pain worse. Rest and sitting make the pain better. Pt has only taken Tylenol for this pain, reports it hasn't helped much. Pt cannot take NSAIDs because of taking OAC.   Pt denies having any issues with urination.     Review of Systems   Constitutional:  Negative for chills and fever.   HENT:  Negative for congestion.    Respiratory:  Negative for cough, chest tightness, shortness of breath and wheezing.    Cardiovascular:  Negative for chest pain, palpitations and leg swelling.   Gastrointestinal:  Negative for abdominal pain, constipation, diarrhea, nausea and vomiting.   Genitourinary:  Negative for difficulty urinating.   Musculoskeletal:  Positive for arthralgias and back pain. Negative for gait problem, joint swelling and myalgias.   Skin: Negative.    Neurological:  Negative for dizziness, weakness, light-headedness and headaches.          Objective   Physical Exam  Vitals and nursing note reviewed.   Constitutional:       General: He is not in acute distress.     Appearance: Normal appearance. He is obese. He is not ill-appearing, toxic-appearing or diaphoretic.   HENT:      Nose: Nose normal.      Mouth/Throat:      Mouth: Mucous membranes are moist.      Pharynx: Oropharynx is clear.   Eyes:      Extraocular Movements: Extraocular movements intact.      Conjunctiva/sclera: Conjunctivae normal.      Pupils: Pupils are equal, round, and reactive to light.   Cardiovascular:      Rate and Rhythm: Normal rate and regular rhythm.      Pulses: Normal pulses.      Heart sounds: Normal heart sounds.   Pulmonary:      Effort: Pulmonary effort is normal.      Breath sounds: Normal breath sounds.   Abdominal:      General: Bowel sounds are normal.      Palpations: Abdomen is soft.   Musculoskeletal:         General: Normal range of

## 2025-05-12 ENCOUNTER — OFFICE VISIT (OUTPATIENT)
Dept: CARDIOLOGY CLINIC | Age: 75
End: 2025-05-12
Payer: MEDICARE

## 2025-05-12 VITALS
DIASTOLIC BLOOD PRESSURE: 60 MMHG | HEART RATE: 79 BPM | WEIGHT: 237 LBS | HEIGHT: 71 IN | SYSTOLIC BLOOD PRESSURE: 128 MMHG | OXYGEN SATURATION: 96 % | BODY MASS INDEX: 33.18 KG/M2

## 2025-05-12 DIAGNOSIS — I82.411 ACUTE DEEP VEIN THROMBOSIS (DVT) OF FEMORAL VEIN OF RIGHT LOWER EXTREMITY (HCC): ICD-10-CM

## 2025-05-12 DIAGNOSIS — I10 PRIMARY HYPERTENSION: ICD-10-CM

## 2025-05-12 DIAGNOSIS — I51.9 RIGHT VENTRICULAR DYSFUNCTION: Primary | ICD-10-CM

## 2025-05-12 DIAGNOSIS — E78.5 HYPERLIPIDEMIA, UNSPECIFIED HYPERLIPIDEMIA TYPE: ICD-10-CM

## 2025-05-12 DIAGNOSIS — I26.02 ACUTE SADDLE PULMONARY EMBOLISM WITH ACUTE COR PULMONALE (HCC): ICD-10-CM

## 2025-05-12 PROCEDURE — 3017F COLORECTAL CA SCREEN DOC REV: CPT | Performed by: INTERNAL MEDICINE

## 2025-05-12 PROCEDURE — 3074F SYST BP LT 130 MM HG: CPT | Performed by: INTERNAL MEDICINE

## 2025-05-12 PROCEDURE — G8417 CALC BMI ABV UP PARAM F/U: HCPCS | Performed by: INTERNAL MEDICINE

## 2025-05-12 PROCEDURE — G2211 COMPLEX E/M VISIT ADD ON: HCPCS | Performed by: INTERNAL MEDICINE

## 2025-05-12 PROCEDURE — 99213 OFFICE O/P EST LOW 20 MIN: CPT | Performed by: INTERNAL MEDICINE

## 2025-05-12 PROCEDURE — 1159F MED LIST DOCD IN RCRD: CPT | Performed by: INTERNAL MEDICINE

## 2025-05-12 PROCEDURE — G8427 DOCREV CUR MEDS BY ELIG CLIN: HCPCS | Performed by: INTERNAL MEDICINE

## 2025-05-12 PROCEDURE — 1123F ACP DISCUSS/DSCN MKR DOCD: CPT | Performed by: INTERNAL MEDICINE

## 2025-05-12 PROCEDURE — 4004F PT TOBACCO SCREEN RCVD TLK: CPT | Performed by: INTERNAL MEDICINE

## 2025-05-12 PROCEDURE — 3078F DIAST BP <80 MM HG: CPT | Performed by: INTERNAL MEDICINE

## 2025-05-12 NOTE — PATIENT INSTRUCTIONS
- Check blood pressure once per day. Take your morning medications, then check your blood pressure 1 hour later. Prior to checking your blood pressure, rest for 5 minutes and make sure you are in a seated position with your feet flat on the ground. This will be the most accurate reading. It is helpful for you to keep a daily blood pressure log. Goal for top number (SBP) <140    - Continue your regular exercise    - Wear a helmet when riding a bike while on blood thinner    Please call the office [phone], or send a message through [employer] with any worsening symptoms, concerns or questions.

## 2025-06-18 RX ORDER — ATORVASTATIN CALCIUM 40 MG/1
40 TABLET, FILM COATED ORAL DAILY
Qty: 90 TABLET | Refills: 3 | Status: SHIPPED | OUTPATIENT
Start: 2025-06-18

## 2025-08-28 ENCOUNTER — OFFICE VISIT (OUTPATIENT)
Age: 75
End: 2025-08-28
Payer: MEDICARE

## 2025-08-28 DIAGNOSIS — L57.0 AK (ACTINIC KERATOSIS): ICD-10-CM

## 2025-08-28 DIAGNOSIS — D48.5 NEOPLASM OF UNCERTAIN BEHAVIOR OF SKIN: ICD-10-CM

## 2025-08-28 DIAGNOSIS — D22.9 MULTIPLE NEVI: ICD-10-CM

## 2025-08-28 DIAGNOSIS — Z85.828 HISTORY OF NONMELANOMA SKIN CANCER: Primary | ICD-10-CM

## 2025-08-28 DIAGNOSIS — Z86.018 HISTORY OF DYSPLASTIC NEVUS: ICD-10-CM

## 2025-08-28 DIAGNOSIS — L82.1 SEBORRHEIC KERATOSIS: ICD-10-CM

## 2025-08-28 PROCEDURE — 3017F COLORECTAL CA SCREEN DOC REV: CPT | Performed by: DERMATOLOGY

## 2025-08-28 PROCEDURE — 1160F RVW MEDS BY RX/DR IN RCRD: CPT | Performed by: DERMATOLOGY

## 2025-08-28 PROCEDURE — 17003 DESTRUCT PREMALG LES 2-14: CPT | Performed by: DERMATOLOGY

## 2025-08-28 PROCEDURE — G8427 DOCREV CUR MEDS BY ELIG CLIN: HCPCS | Performed by: DERMATOLOGY

## 2025-08-28 PROCEDURE — 1159F MED LIST DOCD IN RCRD: CPT | Performed by: DERMATOLOGY

## 2025-08-28 PROCEDURE — G8417 CALC BMI ABV UP PARAM F/U: HCPCS | Performed by: DERMATOLOGY

## 2025-08-28 PROCEDURE — 4004F PT TOBACCO SCREEN RCVD TLK: CPT | Performed by: DERMATOLOGY

## 2025-08-28 PROCEDURE — 99213 OFFICE O/P EST LOW 20 MIN: CPT | Performed by: DERMATOLOGY

## 2025-08-28 PROCEDURE — 17000 DESTRUCT PREMALG LESION: CPT | Performed by: DERMATOLOGY

## 2025-08-28 PROCEDURE — 1123F ACP DISCUSS/DSCN MKR DOCD: CPT | Performed by: DERMATOLOGY

## 2025-08-28 PROCEDURE — 11102 TANGNTL BX SKIN SINGLE LES: CPT | Performed by: DERMATOLOGY

## 2025-08-29 DIAGNOSIS — I82.432 ACUTE DEEP VEIN THROMBOSIS (DVT) OF POPLITEAL VEIN OF LEFT LOWER EXTREMITY (HCC): ICD-10-CM

## 2025-08-29 DIAGNOSIS — I26.02 ACUTE SADDLE PULMONARY EMBOLISM WITH ACUTE COR PULMONALE (HCC): ICD-10-CM

## 2025-09-02 LAB — DERMATOLOGY PATHOLOGY REPORT: NORMAL

## 2025-09-02 RX ORDER — APIXABAN 5 MG/1
5 TABLET, FILM COATED ORAL 2 TIMES DAILY
Qty: 360 TABLET | Refills: 0 | OUTPATIENT
Start: 2025-09-02

## 2025-09-03 DIAGNOSIS — I82.432 ACUTE DEEP VEIN THROMBOSIS (DVT) OF POPLITEAL VEIN OF LEFT LOWER EXTREMITY (HCC): ICD-10-CM

## 2025-09-03 DIAGNOSIS — I26.02 ACUTE SADDLE PULMONARY EMBOLISM WITH ACUTE COR PULMONALE (HCC): ICD-10-CM

## (undated) DEVICE — PAD, DEFIB, ADULT, RADIOTRANS, PHYSIO: Brand: MEDLINE

## (undated) DEVICE — Device: Brand: TRIEVER24

## (undated) DEVICE — FLOWTRIEVER THROMBECTOMY SYSTEM PRICE PER PROCEDURE

## (undated) DEVICE — CATHETER 5FR CORDIS PIG 145DEG 110CM

## (undated) DEVICE — CATHETER ANGIO JR4 AD 52 FRX125 CM STR SUPER TORQUE +

## (undated) DEVICE — 4FR MICROPUNCTURE KIT STIFFEN

## (undated) DEVICE — CATH LAB PACK: Brand: MEDLINE INDUSTRIES, INC.

## (undated) DEVICE — DRAPE,ANGIO,BRACH,STERILE,38X44: Brand: MEDLINE

## (undated) DEVICE — FLOWSAVER: Brand: FLOWSAVER

## (undated) DEVICE — PINNACLE INTRODUCER SHEATH: Brand: PINNACLE

## (undated) DEVICE — DILATOR: Brand: COOK

## (undated) DEVICE — Device

## (undated) DEVICE — Device: Brand: NOMOLINE™ LH ADULT NASAL CO2 CANNULA WITH O2 4M

## (undated) DEVICE — INTRI24™ INTRODUCER SHEATH (SHEATH): Brand: INTRI24 INTRODUCER SHEATH

## (undated) DEVICE — KIT AT-X65 ANGIOTOUCH HAND CONTROLLER

## (undated) DEVICE — GUIDEWIRE VASC ANGLED 3 CM 0.035 INX260 CM STIFF NIT ZIPWIRE

## (undated) DEVICE — GUIDEWIRE: Brand: AMPLATZ SUPER STIFF™

## (undated) DEVICE — CATHETER DIAG L100CM DIA5.2FR 0.038IN POLYUR COR JR4 STD

## (undated) DEVICE — PERCLOSE™ PROSTYLE™ SUTURE-MEDIATED CLOSURE AND REPAIR SYSTEM: Brand: PERCLOSE™ PROSTYLE™